# Patient Record
Sex: FEMALE | Race: WHITE | NOT HISPANIC OR LATINO | URBAN - NONMETROPOLITAN AREA
[De-identification: names, ages, dates, MRNs, and addresses within clinical notes are randomized per-mention and may not be internally consistent; named-entity substitution may affect disease eponyms.]

---

## 2021-01-25 ENCOUNTER — IMMUNIZATIONS (OUTPATIENT)
Dept: FAMILY MEDICINE CLINIC | Facility: HOSPITAL | Age: 81
End: 2021-01-25

## 2021-01-25 DIAGNOSIS — Z23 ENCOUNTER FOR IMMUNIZATION: Primary | ICD-10-CM

## 2021-01-25 PROCEDURE — 91301 SARS-COV-2 / COVID-19 MRNA VACCINE (MODERNA) 100 MCG: CPT | Performed by: PHARMACIST

## 2021-01-25 PROCEDURE — 0011A SARS-COV-2 / COVID-19 MRNA VACCINE (MODERNA) 100 MCG: CPT | Performed by: PHARMACIST

## 2021-02-24 ENCOUNTER — IMMUNIZATIONS (OUTPATIENT)
Dept: FAMILY MEDICINE CLINIC | Facility: HOSPITAL | Age: 81
End: 2021-02-24

## 2021-02-24 DIAGNOSIS — Z23 ENCOUNTER FOR IMMUNIZATION: Primary | ICD-10-CM

## 2021-02-24 PROCEDURE — 91301 SARS-COV-2 / COVID-19 MRNA VACCINE (MODERNA) 100 MCG: CPT

## 2021-02-24 PROCEDURE — 0012A SARS-COV-2 / COVID-19 MRNA VACCINE (MODERNA) 100 MCG: CPT

## 2023-12-02 ENCOUNTER — APPOINTMENT (EMERGENCY)
Dept: CT IMAGING | Facility: HOSPITAL | Age: 83
DRG: 064 | End: 2023-12-02
Payer: MEDICARE

## 2023-12-02 ENCOUNTER — APPOINTMENT (INPATIENT)
Dept: CT IMAGING | Facility: HOSPITAL | Age: 83
DRG: 064 | End: 2023-12-02
Payer: MEDICARE

## 2023-12-02 ENCOUNTER — APPOINTMENT (INPATIENT)
Dept: RADIOLOGY | Facility: HOSPITAL | Age: 83
DRG: 064 | End: 2023-12-02
Payer: MEDICARE

## 2023-12-02 ENCOUNTER — HOSPITAL ENCOUNTER (INPATIENT)
Facility: HOSPITAL | Age: 83
LOS: 3 days | Discharge: HOME HEALTH | DRG: 064 | End: 2023-12-05
Attending: EMERGENCY MEDICINE | Admitting: FAMILY MEDICINE
Payer: MEDICARE

## 2023-12-02 DIAGNOSIS — F41.9 ANXIETY: ICD-10-CM

## 2023-12-02 DIAGNOSIS — M54.41 CHRONIC MIDLINE LOW BACK PAIN WITH BILATERAL SCIATICA: ICD-10-CM

## 2023-12-02 DIAGNOSIS — R40.4 TRANSIENT ALTERATION OF AWARENESS: ICD-10-CM

## 2023-12-02 DIAGNOSIS — R94.01 ABNORMAL EEG: ICD-10-CM

## 2023-12-02 DIAGNOSIS — G89.29 CHRONIC MIDLINE LOW BACK PAIN WITH BILATERAL SCIATICA: ICD-10-CM

## 2023-12-02 DIAGNOSIS — F41.1 GENERALIZED ANXIETY DISORDER: ICD-10-CM

## 2023-12-02 DIAGNOSIS — G89.29 ACUTE EXACERBATION OF CHRONIC LOW BACK PAIN: ICD-10-CM

## 2023-12-02 DIAGNOSIS — I63.9 STROKE (HCC): ICD-10-CM

## 2023-12-02 DIAGNOSIS — M54.50 ACUTE EXACERBATION OF CHRONIC LOW BACK PAIN: ICD-10-CM

## 2023-12-02 DIAGNOSIS — E53.8 B12 DEFICIENCY: ICD-10-CM

## 2023-12-02 DIAGNOSIS — R41.82 ALTERED MENTAL STATE: ICD-10-CM

## 2023-12-02 DIAGNOSIS — M54.42 CHRONIC MIDLINE LOW BACK PAIN WITH BILATERAL SCIATICA: ICD-10-CM

## 2023-12-02 DIAGNOSIS — R47.9 DIFFICULTY WITH SPEECH: Primary | ICD-10-CM

## 2023-12-02 PROBLEM — G93.41 ACUTE METABOLIC ENCEPHALOPATHY: Status: ACTIVE | Noted: 2023-12-02

## 2023-12-02 PROBLEM — N18.31 STAGE 3A CHRONIC KIDNEY DISEASE (CKD) (HCC): Status: ACTIVE | Noted: 2023-12-02

## 2023-12-02 LAB
2HR DELTA HS TROPONIN: 3 NG/L
4HR DELTA HS TROPONIN: 2 NG/L
ALBUMIN SERPL BCP-MCNC: 3.8 G/DL (ref 3.5–5)
ALP SERPL-CCNC: 59 U/L (ref 34–104)
ALT SERPL W P-5'-P-CCNC: 11 U/L (ref 7–52)
AMMONIA PLAS-SCNC: 16 UMOL/L (ref 18–72)
AMPHETAMINES SERPL QL SCN: NEGATIVE
ANION GAP SERPL CALCULATED.3IONS-SCNC: 10 MMOL/L
ANION GAP SERPL CALCULATED.3IONS-SCNC: 9 MMOL/L
APAP SERPL-MCNC: <2 UG/ML (ref 10–20)
APTT PPP: 28 SECONDS (ref 23–37)
AST SERPL W P-5'-P-CCNC: 17 U/L (ref 13–39)
BACTERIA UR QL AUTO: NORMAL /HPF
BARBITURATES UR QL: NEGATIVE
BASOPHILS # BLD AUTO: 0.04 THOUSANDS/ÂΜL (ref 0–0.1)
BASOPHILS NFR BLD AUTO: 1 % (ref 0–1)
BENZODIAZ UR QL: NEGATIVE
BILIRUB SERPL-MCNC: 0.68 MG/DL (ref 0.2–1)
BILIRUB UR QL STRIP: NEGATIVE
BUN SERPL-MCNC: 14 MG/DL (ref 5–25)
BUN SERPL-MCNC: 18 MG/DL (ref 5–25)
CALCIUM SERPL-MCNC: 8 MG/DL (ref 8.4–10.2)
CALCIUM SERPL-MCNC: 9.5 MG/DL (ref 8.4–10.2)
CARDIAC TROPONIN I PNL SERPL HS: 4 NG/L
CARDIAC TROPONIN I PNL SERPL HS: 6 NG/L
CARDIAC TROPONIN I PNL SERPL HS: 7 NG/L
CHLORIDE SERPL-SCNC: 108 MMOL/L (ref 96–108)
CHLORIDE SERPL-SCNC: 108 MMOL/L (ref 96–108)
CK SERPL-CCNC: 31 U/L (ref 26–192)
CLARITY UR: CLEAR
CO2 SERPL-SCNC: 20 MMOL/L (ref 21–32)
CO2 SERPL-SCNC: 23 MMOL/L (ref 21–32)
COCAINE UR QL: NEGATIVE
COLOR UR: YELLOW
CREAT SERPL-MCNC: 0.73 MG/DL (ref 0.6–1.3)
CREAT SERPL-MCNC: 0.81 MG/DL (ref 0.6–1.3)
CRP SERPL QL: 1.2 MG/L
EOSINOPHIL # BLD AUTO: 0.12 THOUSAND/ÂΜL (ref 0–0.61)
EOSINOPHIL NFR BLD AUTO: 1 % (ref 0–6)
ERYTHROCYTE [DISTWIDTH] IN BLOOD BY AUTOMATED COUNT: 12.9 % (ref 11.6–15.1)
ERYTHROCYTE [SEDIMENTATION RATE] IN BLOOD: 16 MM/HOUR (ref 0–29)
ETHANOL SERPL-MCNC: <10 MG/DL
FLUAV RNA RESP QL NAA+PROBE: NEGATIVE
FLUBV RNA RESP QL NAA+PROBE: NEGATIVE
GFR SERPL CREATININE-BSD FRML MDRD: 67 ML/MIN/1.73SQ M
GFR SERPL CREATININE-BSD FRML MDRD: 76 ML/MIN/1.73SQ M
GLUCOSE SERPL-MCNC: 108 MG/DL (ref 65–140)
GLUCOSE SERPL-MCNC: 124 MG/DL (ref 65–140)
GLUCOSE SERPL-MCNC: 93 MG/DL (ref 65–140)
GLUCOSE SERPL-MCNC: 96 MG/DL (ref 65–140)
GLUCOSE UR STRIP-MCNC: NEGATIVE MG/DL
HCT VFR BLD AUTO: 48.1 % (ref 34.8–46.1)
HGB BLD-MCNC: 15.4 G/DL (ref 11.5–15.4)
HGB UR QL STRIP.AUTO: ABNORMAL
IMM GRANULOCYTES # BLD AUTO: 0.04 THOUSAND/UL (ref 0–0.2)
IMM GRANULOCYTES NFR BLD AUTO: 1 % (ref 0–2)
INR PPP: 0.97 (ref 0.84–1.19)
KETONES UR STRIP-MCNC: ABNORMAL MG/DL
LACTATE SERPL-SCNC: 2.1 MMOL/L (ref 0.5–2)
LACTATE SERPL-SCNC: 3 MMOL/L (ref 0.5–2)
LACTATE SERPL-SCNC: 4.2 MMOL/L (ref 0.5–2)
LACTATE SERPL-SCNC: 4.3 MMOL/L (ref 0.5–2)
LEUKOCYTE ESTERASE UR QL STRIP: NEGATIVE
LYMPHOCYTES # BLD AUTO: 1.29 THOUSANDS/ÂΜL (ref 0.6–4.47)
LYMPHOCYTES NFR BLD AUTO: 15 % (ref 14–44)
MCH RBC QN AUTO: 29.6 PG (ref 26.8–34.3)
MCHC RBC AUTO-ENTMCNC: 32 G/DL (ref 31.4–37.4)
MCV RBC AUTO: 92 FL (ref 82–98)
METHADONE UR QL: NEGATIVE
MONOCYTES # BLD AUTO: 0.59 THOUSAND/ÂΜL (ref 0.17–1.22)
MONOCYTES NFR BLD AUTO: 7 % (ref 4–12)
NEUTROPHILS # BLD AUTO: 6.56 THOUSANDS/ÂΜL (ref 1.85–7.62)
NEUTS SEG NFR BLD AUTO: 75 % (ref 43–75)
NITRITE UR QL STRIP: NEGATIVE
NON-SQ EPI CELLS URNS QL MICRO: NORMAL /HPF
NRBC BLD AUTO-RTO: 0 /100 WBCS
OPIATES UR QL SCN: NEGATIVE
OXYCODONE+OXYMORPHONE UR QL SCN: POSITIVE
PCP UR QL: NEGATIVE
PH UR STRIP.AUTO: 7.5 [PH]
PLATELET # BLD AUTO: 350 THOUSANDS/UL (ref 149–390)
PMV BLD AUTO: 8.5 FL (ref 8.9–12.7)
POTASSIUM SERPL-SCNC: 3.5 MMOL/L (ref 3.5–5.3)
POTASSIUM SERPL-SCNC: 3.9 MMOL/L (ref 3.5–5.3)
PROCALCITONIN SERPL-MCNC: <0.05 NG/ML
PROT SERPL-MCNC: 7 G/DL (ref 6.4–8.4)
PROT UR STRIP-MCNC: NEGATIVE MG/DL
PROTHROMBIN TIME: 13.2 SECONDS (ref 11.6–14.5)
RBC # BLD AUTO: 5.21 MILLION/UL (ref 3.81–5.12)
RBC #/AREA URNS AUTO: NORMAL /HPF
RSV RNA RESP QL NAA+PROBE: NEGATIVE
SARS-COV-2 RNA RESP QL NAA+PROBE: NEGATIVE
SODIUM SERPL-SCNC: 137 MMOL/L (ref 135–147)
SODIUM SERPL-SCNC: 141 MMOL/L (ref 135–147)
SP GR UR STRIP.AUTO: 1.01 (ref 1–1.03)
THC UR QL: NEGATIVE
TSH SERPL DL<=0.05 MIU/L-ACNC: 0.47 UIU/ML (ref 0.45–4.5)
URATE SERPL-MCNC: 5.5 MG/DL (ref 2–7.5)
UROBILINOGEN UR QL STRIP.AUTO: 0.2 E.U./DL
VIT B12 SERPL-MCNC: 180 PG/ML (ref 180–914)
WBC # BLD AUTO: 8.64 THOUSAND/UL (ref 4.31–10.16)
WBC #/AREA URNS AUTO: NORMAL /HPF

## 2023-12-02 PROCEDURE — 96375 TX/PRO/DX INJ NEW DRUG ADDON: CPT

## 2023-12-02 PROCEDURE — 84550 ASSAY OF BLOOD/URIC ACID: CPT

## 2023-12-02 PROCEDURE — 70498 CT ANGIOGRAPHY NECK: CPT

## 2023-12-02 PROCEDURE — 74176 CT ABD & PELVIS W/O CONTRAST: CPT

## 2023-12-02 PROCEDURE — 99223 1ST HOSP IP/OBS HIGH 75: CPT | Performed by: FAMILY MEDICINE

## 2023-12-02 PROCEDURE — 82948 REAGENT STRIP/BLOOD GLUCOSE: CPT

## 2023-12-02 PROCEDURE — 84443 ASSAY THYROID STIM HORMONE: CPT

## 2023-12-02 PROCEDURE — 80307 DRUG TEST PRSMV CHEM ANLYZR: CPT

## 2023-12-02 PROCEDURE — 82077 ASSAY SPEC XCP UR&BREATH IA: CPT

## 2023-12-02 PROCEDURE — 82550 ASSAY OF CK (CPK): CPT

## 2023-12-02 PROCEDURE — 81001 URINALYSIS AUTO W/SCOPE: CPT

## 2023-12-02 PROCEDURE — 80053 COMPREHEN METABOLIC PANEL: CPT | Performed by: EMERGENCY MEDICINE

## 2023-12-02 PROCEDURE — 87154 CUL TYP ID BLD PTHGN 6+ TRGT: CPT

## 2023-12-02 PROCEDURE — 80143 DRUG ASSAY ACETAMINOPHEN: CPT

## 2023-12-02 PROCEDURE — 99285 EMERGENCY DEPT VISIT HI MDM: CPT | Performed by: EMERGENCY MEDICINE

## 2023-12-02 PROCEDURE — 85730 THROMBOPLASTIN TIME PARTIAL: CPT | Performed by: EMERGENCY MEDICINE

## 2023-12-02 PROCEDURE — 72131 CT LUMBAR SPINE W/O DYE: CPT

## 2023-12-02 PROCEDURE — 85025 COMPLETE CBC W/AUTO DIFF WBC: CPT | Performed by: EMERGENCY MEDICINE

## 2023-12-02 PROCEDURE — 87040 BLOOD CULTURE FOR BACTERIA: CPT

## 2023-12-02 PROCEDURE — 99285 EMERGENCY DEPT VISIT HI MDM: CPT

## 2023-12-02 PROCEDURE — 0241U HB NFCT DS VIR RESP RNA 4 TRGT: CPT | Performed by: EMERGENCY MEDICINE

## 2023-12-02 PROCEDURE — 84484 ASSAY OF TROPONIN QUANT: CPT | Performed by: EMERGENCY MEDICINE

## 2023-12-02 PROCEDURE — 84145 PROCALCITONIN (PCT): CPT

## 2023-12-02 PROCEDURE — 85610 PROTHROMBIN TIME: CPT | Performed by: EMERGENCY MEDICINE

## 2023-12-02 PROCEDURE — 70496 CT ANGIOGRAPHY HEAD: CPT

## 2023-12-02 PROCEDURE — 82140 ASSAY OF AMMONIA: CPT

## 2023-12-02 PROCEDURE — 83605 ASSAY OF LACTIC ACID: CPT

## 2023-12-02 PROCEDURE — 36415 COLL VENOUS BLD VENIPUNCTURE: CPT | Performed by: EMERGENCY MEDICINE

## 2023-12-02 PROCEDURE — 71045 X-RAY EXAM CHEST 1 VIEW: CPT

## 2023-12-02 PROCEDURE — 85652 RBC SED RATE AUTOMATED: CPT

## 2023-12-02 PROCEDURE — 82607 VITAMIN B-12: CPT

## 2023-12-02 PROCEDURE — 86140 C-REACTIVE PROTEIN: CPT

## 2023-12-02 PROCEDURE — 96374 THER/PROPH/DIAG INJ IV PUSH: CPT

## 2023-12-02 PROCEDURE — 93005 ELECTROCARDIOGRAM TRACING: CPT

## 2023-12-02 PROCEDURE — 80048 BASIC METABOLIC PNL TOTAL CA: CPT

## 2023-12-02 RX ORDER — KETOROLAC TROMETHAMINE 30 MG/ML
15 INJECTION, SOLUTION INTRAMUSCULAR; INTRAVENOUS EVERY 6 HOURS PRN
Status: DISCONTINUED | OUTPATIENT
Start: 2023-12-02 | End: 2023-12-02

## 2023-12-02 RX ORDER — LIDOCAINE 50 MG/G
1 PATCH TOPICAL DAILY
Status: DISCONTINUED | OUTPATIENT
Start: 2023-12-02 | End: 2023-12-05 | Stop reason: HOSPADM

## 2023-12-02 RX ORDER — ENOXAPARIN SODIUM 100 MG/ML
40 INJECTION SUBCUTANEOUS DAILY
Status: DISCONTINUED | OUTPATIENT
Start: 2023-12-02 | End: 2023-12-02

## 2023-12-02 RX ORDER — SODIUM CHLORIDE 9 MG/ML
125 INJECTION, SOLUTION INTRAVENOUS CONTINUOUS
Status: DISCONTINUED | OUTPATIENT
Start: 2023-12-02 | End: 2023-12-03

## 2023-12-02 RX ORDER — HYDROMORPHONE HCL IN WATER/PF 6 MG/30 ML
0.2 PATIENT CONTROLLED ANALGESIA SYRINGE INTRAVENOUS EVERY 6 HOURS PRN
Status: DISCONTINUED | OUTPATIENT
Start: 2023-12-02 | End: 2023-12-05 | Stop reason: HOSPADM

## 2023-12-02 RX ORDER — ACETAMINOPHEN 325 MG/1
975 TABLET ORAL EVERY 6 HOURS
Status: DISCONTINUED | OUTPATIENT
Start: 2023-12-02 | End: 2023-12-05 | Stop reason: HOSPADM

## 2023-12-02 RX ORDER — DIAZEPAM 2 MG/1
2 TABLET ORAL ONCE
Status: COMPLETED | OUTPATIENT
Start: 2023-12-02 | End: 2023-12-02

## 2023-12-02 RX ORDER — KETOROLAC TROMETHAMINE 30 MG/ML
15 INJECTION, SOLUTION INTRAMUSCULAR; INTRAVENOUS ONCE
Status: COMPLETED | OUTPATIENT
Start: 2023-12-02 | End: 2023-12-02

## 2023-12-02 RX ORDER — ONDANSETRON 2 MG/ML
INJECTION INTRAMUSCULAR; INTRAVENOUS
Status: COMPLETED
Start: 2023-12-02 | End: 2023-12-02

## 2023-12-02 RX ORDER — SENNOSIDES 8.6 MG
650 CAPSULE ORAL EVERY 8 HOURS PRN
COMMUNITY

## 2023-12-02 RX ORDER — KETOROLAC TROMETHAMINE 30 MG/ML
15 INJECTION, SOLUTION INTRAMUSCULAR; INTRAVENOUS EVERY 6 HOURS SCHEDULED
Status: DISPENSED | OUTPATIENT
Start: 2023-12-02 | End: 2023-12-03

## 2023-12-02 RX ORDER — HEPARIN SODIUM 5000 [USP'U]/ML
5000 INJECTION, SOLUTION INTRAVENOUS; SUBCUTANEOUS EVERY 8 HOURS SCHEDULED
Status: DISCONTINUED | OUTPATIENT
Start: 2023-12-02 | End: 2023-12-05 | Stop reason: HOSPADM

## 2023-12-02 RX ORDER — ONDANSETRON 2 MG/ML
4 INJECTION INTRAMUSCULAR; INTRAVENOUS EVERY 6 HOURS PRN
Status: DISCONTINUED | OUTPATIENT
Start: 2023-12-02 | End: 2023-12-05 | Stop reason: HOSPADM

## 2023-12-02 RX ORDER — OXYCODONE HYDROCHLORIDE 5 MG/1
5 TABLET ORAL EVERY 6 HOURS PRN
Status: DISCONTINUED | OUTPATIENT
Start: 2023-12-02 | End: 2023-12-05 | Stop reason: HOSPADM

## 2023-12-02 RX ORDER — LABETALOL HYDROCHLORIDE 5 MG/ML
10 INJECTION, SOLUTION INTRAVENOUS ONCE
Status: COMPLETED | OUTPATIENT
Start: 2023-12-02 | End: 2023-12-02

## 2023-12-02 RX ADMIN — HEPARIN SODIUM 5000 UNITS: 5000 INJECTION INTRAVENOUS; SUBCUTANEOUS at 14:05

## 2023-12-02 RX ADMIN — Medication 10 MG: at 08:55

## 2023-12-02 RX ADMIN — KETOROLAC TROMETHAMINE 15 MG: 30 INJECTION, SOLUTION INTRAMUSCULAR; INTRAVENOUS at 14:01

## 2023-12-02 RX ADMIN — SODIUM CHLORIDE 1000 ML: 0.9 INJECTION, SOLUTION INTRAVENOUS at 15:38

## 2023-12-02 RX ADMIN — SODIUM CHLORIDE 125 ML/HR: 0.9 INJECTION, SOLUTION INTRAVENOUS at 17:32

## 2023-12-02 RX ADMIN — OXYCODONE HYDROCHLORIDE 5 MG: 5 TABLET ORAL at 15:46

## 2023-12-02 RX ADMIN — SODIUM CHLORIDE 1000 ML: 0.9 INJECTION, SOLUTION INTRAVENOUS at 12:44

## 2023-12-02 RX ADMIN — HEPARIN SODIUM 5000 UNITS: 5000 INJECTION INTRAVENOUS; SUBCUTANEOUS at 21:29

## 2023-12-02 RX ADMIN — SODIUM CHLORIDE 125 ML/HR: 0.9 INJECTION, SOLUTION INTRAVENOUS at 13:34

## 2023-12-02 RX ADMIN — LIDOCAINE 5% 1 PATCH: 700 PATCH TOPICAL at 12:45

## 2023-12-02 RX ADMIN — TRIMETHOBENZAMIDE HYDROCHLORIDE 200 MG: 100 INJECTION INTRAMUSCULAR at 22:38

## 2023-12-02 RX ADMIN — DIAZEPAM 2 MG: 2 TABLET ORAL at 09:56

## 2023-12-02 RX ADMIN — ONDANSETRON 4 MG: 2 INJECTION INTRAMUSCULAR; INTRAVENOUS at 15:42

## 2023-12-02 RX ADMIN — KETOROLAC TROMETHAMINE 15 MG: 30 INJECTION, SOLUTION INTRAMUSCULAR; INTRAVENOUS at 08:56

## 2023-12-02 RX ADMIN — ONDANSETRON 4 MG: 2 INJECTION INTRAMUSCULAR; INTRAVENOUS at 12:32

## 2023-12-02 RX ADMIN — IOHEXOL 92 ML: 350 INJECTION, SOLUTION INTRAVENOUS at 08:46

## 2023-12-02 RX ADMIN — ONDANSETRON 4 MG: 2 INJECTION INTRAMUSCULAR; INTRAVENOUS at 21:29

## 2023-12-02 RX ADMIN — KETOROLAC TROMETHAMINE 15 MG: 30 INJECTION, SOLUTION INTRAMUSCULAR; INTRAVENOUS at 22:38

## 2023-12-02 NOTE — STROKE DOCUMENTATION
Pt no longer having difficulty with word finding. Appears less anxious. Lights dimmed.  Friend at bedside

## 2023-12-02 NOTE — ASSESSMENT & PLAN NOTE
POA with slow speech and poor mentation per neighbor/friend. Also with worsening back pain since morning of admission. Son states patient has history of AMS when "traumatic" events occur, and attributes current mentation to severe back pain  Neuro consulted and recommending head ct and CTA. No need for stroke workup as both images negative for any acute process  TSH, UDS, ethanol level pending  UA and procal negative. Afebrile and without leukocytosis  Lactic mildly elevated, given 1L bolus and started on IVF continuous   Flu/covid/rsv negative  C diff and stool samples pending 2/2 diarrhea  Psych consulted due to very anxious and tearful exam with poor mentation  CT AP without any infectious process. Diverticulosis, renal cysts without hydronephrosis  CXR pending  Infectious workup negative thus far  Continue to reorient as needed.  Delirium precautions

## 2023-12-02 NOTE — ED PROVIDER NOTES
History  Chief Complaint   Patient presents with    Back Pain     Pt states acute on chronic back pain worse today when getting up to go to the bathroom. States hx of radiculopathy. 80-year-old female with a history of chronic back pain presents today with an acute onset of back pain when trying to get out of bed. She was able to get to the bathroom but then had to crawl to her phone because she could not ambulate any longer. She reported that the pain was severe. Denied any trauma. Patient also reports bowel incontinence but this is chronic for  "years"      History provided by:  Patient and friend  Back Pain  Location:  Lumbar spine  Quality:  Aching and stabbing  Radiates to:  L thigh, L knee and L foot  Pain severity:  Severe  Onset quality:  Sudden  Timing:  Constant  Progression:  Unchanged  Chronicity:  New  Context: emotional stress    Relieved by:  Lying down  Worsened by:  Ambulation and bending  Associated symptoms: bowel incontinence (chronic), leg pain, numbness (chronic) and paresthesias (chronic)    Associated symptoms: no bladder incontinence, no chest pain, no perianal numbness, no tingling and no weakness    Risk factors: hx of osteoporosis and obesity        None       Past Medical History:   Diagnosis Date    Arthritis        History reviewed. No pertinent surgical history. History reviewed. No pertinent family history. I have reviewed and agree with the history as documented. E-Cigarette/Vaping    E-Cigarette Use Never User      E-Cigarette/Vaping Substances     Social History     Tobacco Use    Smoking status: Never    Smokeless tobacco: Never   Vaping Use    Vaping Use: Never used   Substance Use Topics    Alcohol use: Never    Drug use: Never       Review of Systems   Constitutional:  Negative for fatigue. HENT: Negative. Respiratory:  Negative for shortness of breath and wheezing. Cardiovascular:  Negative for chest pain and palpitations.    Gastrointestinal: Positive for bowel incontinence (chronic). Negative for abdominal distention, nausea and vomiting. Genitourinary:  Negative for bladder incontinence and flank pain. Musculoskeletal:  Positive for back pain. Neurological:  Positive for numbness (chronic) and paresthesias (chronic). Negative for tingling and weakness. Psychiatric/Behavioral:  The patient is nervous/anxious. All other systems reviewed and are negative. Physical Exam  Physical Exam  Vitals and nursing note reviewed. Constitutional:       General: She is awake. She is in acute distress. Appearance: Normal appearance. She is well-developed. She is obese. She is not ill-appearing or toxic-appearing. HENT:      Head: Normocephalic and atraumatic. Right Ear: External ear normal.      Left Ear: External ear normal.      Nose: Nose normal.      Mouth/Throat:      Mouth: Mucous membranes are moist.   Eyes:      General: Lids are normal. No scleral icterus. Extraocular Movements: Extraocular movements intact. Pupils: Pupils are equal, round, and reactive to light. Cardiovascular:      Rate and Rhythm: Normal rate and regular rhythm. Pulses: No decreased pulses. Heart sounds: Normal heart sounds. No murmur heard. Comments: Good cap refill  Pulmonary:      Effort: Pulmonary effort is normal. No respiratory distress. Breath sounds: Normal breath sounds. No wheezing, rhonchi or rales. Abdominal:      General: Abdomen is flat. There is no distension. Palpations: Abdomen is soft. Tenderness: There is no abdominal tenderness. There is no right CVA tenderness, left CVA tenderness, guarding or rebound. Musculoskeletal:         General: Tenderness present. No swelling, deformity or signs of injury. Normal range of motion. Cervical back: Normal range of motion and neck supple. Thoracic back: Normal.      Lumbar back: Tenderness and bony tenderness present. No spasms.       Right lower leg: No edema. Left lower leg: No edema. Skin:     General: Skin is warm and dry. Coloration: Skin is not jaundiced or pale. Findings: No rash. Neurological:      Mental Status: She is alert. Cranial Nerves: No cranial nerve deficit. Motor: No weakness. Comments: See NIH   Psychiatric:         Attention and Perception: She is inattentive. Mood and Affect: Mood is anxious. Affect is tearful. Speech: Speech is delayed.          Vital Signs  ED Triage Vitals   Temperature Pulse Respirations Blood Pressure SpO2   12/02/23 0731 12/02/23 0731 12/02/23 0731 12/02/23 0731 12/02/23 0731   97.5 °F (36.4 °C) 88 18 (!) 187/81 100 %      Temp Source Heart Rate Source Patient Position - Orthostatic VS BP Location FiO2 (%)   12/02/23 0731 12/02/23 0825 -- -- --   Temporal Monitor         Pain Score       12/02/23 0731       8           Vitals:    12/02/23 0855 12/02/23 0910 12/02/23 0925 12/02/23 0940   BP: (!) 188/77 (!) 180/78 150/72 168/81   Pulse: 86 88 84 81         Visual Acuity  Visual Acuity      Flowsheet Row Most Recent Value   L Pupil Size (mm) 3   R Pupil Size (mm) 3            ED Medications  Medications   ketorolac (TORADOL) injection 15 mg (15 mg Intravenous Given 12/2/23 0856)   iohexol (OMNIPAQUE) 350 MG/ML injection (SINGLE-DOSE) 92 mL (92 mL Intravenous Given 12/2/23 0846)   labetalol (NORMODYNE) injection 10 mg (10 mg Intravenous Given 12/2/23 0855)   diazepam (VALIUM) tablet 2 mg (2 mg Oral Given 12/2/23 0956)       Diagnostic Studies  Results Reviewed       Procedure Component Value Units Date/Time    HS Troponin 0hr (reflex protocol) [[id number]]  (Normal) Collected: 12/02/23 0853    Lab Status: Final result Specimen: Blood from Arm, Right Updated: 12/02/23 0922     hs TnI 0hr 4 ng/L     HS Troponin I 2hr [[id number]]     Lab Status: No result Specimen: Blood     Protime-INR [[id number]]  (Normal) Collected: 12/02/23 0853    Lab Status: Final result Specimen: Blood from Arm, Right Updated: 12/02/23 0916     Protime 13.2 seconds      INR 0.97    APTT [[id number]]  (Normal) Collected: 12/02/23 0853    Lab Status: Final result Specimen: Blood from Arm, Right Updated: 12/02/23 0916     PTT 28 seconds     Comprehensive metabolic panel [[id number]] Collected: 12/02/23 0827    Lab Status: Final result Specimen: Blood from Arm, Right Updated: 12/02/23 0913     Sodium 141 mmol/L      Potassium 3.5 mmol/L      Chloride 108 mmol/L      CO2 23 mmol/L      ANION GAP 10 mmol/L      BUN 18 mg/dL      Creatinine 0.81 mg/dL      Glucose 108 mg/dL      Calcium 9.5 mg/dL      AST 17 U/L      ALT 11 U/L      Alkaline Phosphatase 59 U/L      Total Protein 7.0 g/dL      Albumin 3.8 g/dL      Total Bilirubin 0.68 mg/dL      eGFR 67 ml/min/1.73sq m     Narrative:      [facility] Disease (CKD):     Stage 1 with normal or high GFR (GFR > 90 mL/min/1.73 square meters)    Stage 2 Mild CKD (GFR = 60-89 mL/min/1.73 square meters)    Stage 3A Moderate CKD (GFR = 45-59 mL/min/1.73 square meters)    Stage 3B Moderate CKD (GFR = 30-44 mL/min/1.73 square meters)    Stage 4 Severe CKD (GFR = 15-29 mL/min/1.73 square meters)    Stage 5 End Stage CKD (GFR <15 mL/min/1.73 square meters)  Note: GFR calculation is accurate only with a steady state creatinine    FLU/RSV/COVID - if FLU/RSV clinically relevant [[id number]] Collected: 12/02/23 0853    Lab Status:  In process Specimen: Nares from Nasopharyngeal Swab Updated: 12/02/23 0855    CBC and differential [[id number]]  (Abnormal) Collected: 12/02/23 0827    Lab Status: Final result Specimen: Blood from Arm, Right Updated: 12/02/23 0847     WBC 8.64 Thousand/uL      RBC 5.21 Million/uL      Hemoglobin 15.4 g/dL      Hematocrit 48.1 %      MCV 92 fL      MCH 29.6 pg      MCHC 32.0 g/dL      RDW 12.9 %      MPV 8.5 fL      Platelets 009 Thousands/uL      nRBC 0 /100 WBCs      Neutrophils Relative 75 %      Immat GRANS % 1 %      Lymphocytes Relative 15 %      Monocytes Relative 7 %      Eosinophils Relative 1 %      Basophils Relative 1 %      Neutrophils Absolute 6.56 Thousands/µL      Immature Grans Absolute 0.04 Thousand/uL      Lymphocytes Absolute 1.29 Thousands/µL      Monocytes Absolute 0.59 Thousand/µL      Eosinophils Absolute 0.12 Thousand/µL      Basophils Absolute 0.04 Thousands/µL     Fingerstick Glucose (POCT) [[id number]]  (Normal) Collected: 12/02/23 0822    Lab Status: Final result Updated: 12/02/23 0823     POC Glucose 96 mg/dl                    CTA stroke alert (head/neck)   Final Result by Ben Call MD (12/02 0855)      1. No intracranial large vessel occlusion. 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 2.  No hemodynamically significant stenosis or dissection of cervical carotid and vertebral arteries. 3.  Severe multinodular thyromegaly. Correlate with prior thyroid work-up/ultrasound. Findings were directly discussed with Shane Charles at 8:52 a.m. Workstation performed: [id number]         CT stroke alert brain   Final Result by Ben Call MD (12/02 5181)      No acute intracranial CT abnormality. Findings were directly discussed with Shane Charles at 8:52 a.m. Workstation performed: [id number]         CT spine lumbar without contrast    (Results Pending)              Procedures  Procedures         ED Course  ED Course as of 12/02/23 0959   Sat Dec 02, 2023   0825 Called to room by nursing because patient "not acting right" as per neighbor at bedside. Pt not able to name objects per nurse and per patient. Stroke alert called   0830 Patient reevaluated. Patient is extremely anxious. Patient is tearful. Patient will follow commands. Patient will not name objects but then will patient will not name objects but then will turn abruptly and asked the neighbor to make sure she has her purse.   Will maintain stroke alert and an abundance of caution. Neurology consulted. Suspect anxiety component. 4537 D/w Dr. Aicha Bates (neurology)   9020 CTA shows no large vessel occulusion. CT brain negative   0930 Neurology consultation appreciated. Agree that this is low likelihood of stroke. 5508 Will attempt to ambulate. 0998 Ambulated independently with walker. 8391 Patient now having another episode of confusion. Will admit. 3793 Sx appear mostly related to mental health, however, not completely clear. Patient is not safe to discharge to home. May not be safe long term in home. Referred for admission                  Stroke Assessment       Row Name 12/02/23 0831             NIH Stroke Scale    Interval Baseline      Level of Consciousness (1a.) 0      LOC Questions (1b.) 0      LOC Commands (1c.) 0      Best Gaze (2.) 0      Visual (3.) 0      Facial Palsy (4.) 0      Motor Arm, Left (5a.) 0      Motor Arm, Right (5b.) 0      Motor Leg, Left (6a.) 0      Motor Leg, Right (6b.) 0      Limb Ataxia (7.) 0      Sensory (8.) 0      Best Language (9.) 1      Dysarthria (10.) 0      Extinction and Inattention (11.) (Formerly Neglect) 0      Total 1                    Flowsheet Row Most Recent Value   Thrombolytic Decision Options    Thrombolytic Decision Patient not a candidate. Patient is not a candidate options comment  [Alternartive dx more likely]                      SBIRT 22yo+      Flowsheet Row Most Recent Value   Initial Alcohol Screen: US AUDIT-C     1. How often do you have a drink containing alcohol? 0 Filed at: 12/02/2023 0733   2. How many drinks containing alcohol do you have on a typical day you are drinking? 0 Filed at: 12/02/2023 0733   3b. FEMALE Any Age, or MALE 65+: How often do you have 4 or more drinks on one occassion? 0 Filed at: 12/02/2023 2494   Audit-C Score 0 Filed at: 12/02/2023 0181   PITER: How many times in the past year have you. ..     Used an illegal drug or used a prescription medication for non-medical reasons? Never Filed at: 12/02/2023 5735                      Medical Decision Making  Patient presented to the emergency department and a MSE was performed. The patient was evaluated for complaint related to acute back pain. Patient is potentially at risk for, but not limited to, musculoskeletal pain, sciatica, degenerative disc disease, traumatic injury, occult fracture, discitis, osteomyelitis, spinal cord abscess  or other infectious etiology, spinal cord hemorrhage, conus medullaris, or cauda equina syndrome. Several of these diagnoses have been evaluated and ruled out by history and physical.  As needed, patient will be further evaluated with laboratory and imaging studies. Higher level diagnostics, such as CT imaging or ultrasound, may also be required. Please see work-up portion of the note for further evaluation of patient's risk. Socioeconomic factors were also considered as part of the decision-making process. Unless otherwise stated in the chart or patient is admitted as elsewhere documented, any previously prescribed medications will be maintained. Problems Addressed:  Acute exacerbation of chronic low back pain: chronic illness or injury with severe exacerbation, progression, or side effects of treatment  Altered mental state: complicated acute illness or injury with systemic symptoms  Difficulty with speech: undiagnosed new problem with uncertain prognosis  Generalized anxiety disorder: chronic illness or injury with exacerbation, progression, or side effects of treatment    Amount and/or Complexity of Data Reviewed  Labs: ordered. Radiology: ordered. Risk  Prescription drug management. Decision regarding hospitalization. Risk Details: Patient presented to the emergency department and a MSE was performed. The patient was evaluated and diagnosed with acute altered mental status and intractable back pain.  This is a new issue that will require additional planned work-up and treatment in a hospitalized setting. As may have been required as part of this evaluation, clinical laboratory test, radiology imaging and medical testing (I.e. EKG) were ordered as necessitated by the patient's presentation. I independently reviewed these studies, imaging and testing. This patient's case is considered to be a considerable risk secondary to the above listed disease process and poses a threat to the patient's well-being and baseline function. Further in-patient diagnostic testing and management, which may include the administration of parenteral medications, is required. Disposition  Final diagnoses:   Difficulty with speech   Acute exacerbation of chronic low back pain   Generalized anxiety disorder   Altered mental state     Time reflects when diagnosis was documented in both MDM as applicable and the Disposition within this note       Time User Action Codes Description Comment    12/2/2023  8:29 AM Aung Nicholson [R47.9] Difficulty with speech     12/2/2023  8:31 AM Aung Nicholson [M54.50,  G89.29] Acute exacerbation of chronic low back pain     12/2/2023  8:31 AM Aung Nicholson [F41.1] Generalized anxiety disorder     12/2/2023  9:53 AM Aung Nicholson [R41.82] Altered mental state           ED Disposition       ED Disposition   Admit    Condition   Stable    Date/Time   Sat Dec 2, 2023 1599    Comment                  Follow-up Information    None         Patient's Medications    No medications on file       No discharge procedures on file.     PDMP Review       None            ED Provider  Electronically Signed by             Nawaf Fatima DO  12/02/23 0487

## 2023-12-02 NOTE — ASSESSMENT & PLAN NOTE
History of degenerative disc disease with worsening pain since morning of admission when getting out of bed  Lumbar CT showing no acute fracture or malalignment. Chronic degenerative changes   History of bowel incontinence and chronic diarrhea  MRI lumbar spine pending to rule out cord compression  Pain management - heat, toradol, tylenol, and lidoderm patch.  Avoid narcotics due to AMS  PT/OT consulted

## 2023-12-02 NOTE — ED NOTES
Pt assisted onto bedside commode, episode of diarrhea. Pt placed back in bed.  Awaits inpatient bed at this time     Akil Hooper, LAURA  12/02/23 0610

## 2023-12-02 NOTE — QUICK NOTE
Stroke alert note: An 79 y/o female who is here for back pain, was noted by the ED physician to be extremely anxious and emotional during the encounter. After he stepped out of the room, he was called back as she was having difficulties answering questions. However he noted that she was not consistent, having difficulties when asked directly (what is this object?) but can talk fluently when talking to her friend in the room for example. NIHSS 1 for language. Stroke alert was activated. However, the likelihood of this being a stroke based on the information provided is quite low. We agree with obtaining initial stroke alert imaging (CT and CTA), but if those are negative, and if the patient returns to complete baseline of normal language, then yield is extremely low, and we do not recommend stroke work-up. TNK not given: low NIHSS.  Low likelihood of a stroke

## 2023-12-02 NOTE — ED NOTES
Patient appearing to be very anxious at this time. Patient shaking, stating her back hurts with nausea. Patient continues to dry heave. Patient requesting for PO water & able to tolerate with no vomiting present, however, continues to dry heave. BS checked as son stating patient has not eaten today.  BS normal.      Maya Ashley RN  12/02/23 6351

## 2023-12-02 NOTE — ED NOTES
Patient ambulating to bedside commode with assistance of staff; patient endorses back pain with nausea & headache. Patient continues to have periods of confusion with difficulty finding words. Previous RN at bedside & stating similar episode occurring when patient arrived to ED. [place] text sent to inpatient [facility]. Verbal order for 4mg zofran to be given per Dr. Monica Graham.      Izzy Long  12/02/23 4590

## 2023-12-02 NOTE — ED NOTES
Rn was called to room by friend of patient, pt unable to answer simple questions      Mckay Phoenix RN  12/02/23 0833

## 2023-12-02 NOTE — ED NOTES
Patient transported to [address] [place] [address], [address], [address]  12/02/23 9924

## 2023-12-02 NOTE — H&P
[facility]  H&P  Name: Claudia Habermann 80 y.o. female I MRN: [id number]  Unit/Bed#: -01 I Date of Admission: 12/2/2023   Date of Service: 12/2/2023 I Hospital Day: 0      Assessment/Plan   * Acute metabolic encephalopathy  Assessment & Plan  POA with slow speech and poor mentation per neighbor/friend. Also with worsening back pain since morning of admission. Son states patient has history of AMS when "traumatic" events occur, and attributes current mentation to severe back pain  Neuro consulted and recommending head ct and CTA. No need for stroke workup as both images negative for any acute process  TSH, UDS, ethanol level pending  UA and procal negative. Afebrile and without leukocytosis  Lactic mildly elevated, given 1L bolus and started on IVF continuous   Flu/covid/rsv negative  C diff and stool samples pending 2/2 diarrhea  Psych consulted due to very anxious and tearful exam with poor mentation  CT AP without any infectious process. Diverticulosis, renal cysts without hydronephrosis  CXR pending  Infectious workup negative thus far  Continue to reorient as needed. Delirium precautions    Stage 3a chronic kidney disease (CKD) ([facility])  Assessment & Plan  Lab Results   Component Value Date    EGFR 67 12/02/2023    CREATININE 0.81 12/02/2023   Currently at baseline   Avoid nephrotoxic agent and hypotension    Chronic low back pain  Assessment & Plan  History of degenerative disc disease with worsening pain since morning of admission when getting out of bed  Lumbar CT showing no acute fracture or malalignment. Chronic degenerative changes   History of bowel incontinence and chronic diarrhea  MRI lumbar spine pending to rule out cord compression  Pain management - heat, toradol, tylenol, and lidoderm patch. Avoid narcotics due to AMS  PT/OT consulted          VTE Pharmacologic Prophylaxis:   High Risk (Score >/= 5) - Pharmacological DVT Prophylaxis Ordered: heparin.  Sequential Compression Devices Ordered. Code Status: Level 3 - DNAR and DNI   Discussion with family: Updated  (son) at bedside. Anticipated Length of Stay: Patient will be admitted on an inpatient basis with an anticipated length of stay of greater than 2 midnights secondary to acute metabolic encephalopathy. Total Time Spent on Date of Encounter in care of patient: 60 mins. This time was spent on one or more of the following: performing physical exam; counseling and coordination of care; obtaining or reviewing history; documenting in the medical record; reviewing/ordering tests, medications or procedures; communicating with other healthcare professionals and discussing with patient's family/caregivers. Chief Complaint: "my back has been hurting worse since this morning"    History of Present Illness:  Tiff Monroe is a 80 y.o. female with a PMH of chronic low back pain and ckd stage 3a who presents with back pain. Son at bedside to provide most of history. States patient woke up this morning was trying to ambulate to the bathroom when her back pain got severely worse. States she has chronic back pain with left lower extremity sciatica, however this morning got acutely worse when ambulating. No falls at home. Patient did ambulate to bathroom and then called neighbor to come help her. EMS brought patient to ED. Patient poor historian on exam.  Patient lives alone and is independent at baseline and uses cane to get around. Denies any recent trauma or sick contacts. Son states patient has chronic diarrhea and is sometimes incontinent of bowels, however this has gone on for years. Review of Systems:  Review of Systems   Constitutional:  Negative for diaphoresis, fatigue and fever. HENT:  Negative for congestion, sneezing and sore throat. Respiratory:  Negative for cough, chest tightness, shortness of breath and wheezing.     Cardiovascular:  Negative for chest pain, palpitations and leg swelling. Gastrointestinal:  Positive for abdominal pain and diarrhea. Negative for abdominal distention, blood in stool, constipation and vomiting. Genitourinary:  Negative for difficulty urinating and dysuria. Musculoskeletal:  Positive for back pain and gait problem. Negative for arthralgias. Neurological:  Positive for headaches. Negative for dizziness, tremors, speech difficulty, weakness and numbness. Psychiatric/Behavioral:  Negative for agitation, behavioral problems and confusion. Past Medical and Surgical History:   Past Medical History:   Diagnosis Date    Anxiety     Arthritis     Depression     Microscopic hematuria     Renal insufficiency        Past Surgical History:   Procedure Laterality Date    ARTHROSCOPY KNEE      CHOLECYSTECTOMY      MENISCECTOMY         Meds/Allergies:  Prior to Admission medications    Not on File     I have reviewed home medications with patient personally. Allergies: Allergies   Allergen Reactions    Levofloxacin Rash     Itching,nausea    Shellfish-Derived Products - Food Allergy Swelling    Moxifloxacin Hives, Rash and Swelling    Penicillins Rash     hives       Social History:  Marital Status:    Patient Pre-hospital Living Situation: Home  Patient Pre-hospital Level of Mobility: walks with cane  Patient Pre-hospital Diet Restrictions: None  Substance Use History:   Social History     Substance and Sexual Activity   Alcohol Use Never     Social History     Tobacco Use   Smoking Status Never   Smokeless Tobacco Never     Social History     Substance and Sexual Activity   Drug Use Never       Family History:  History reviewed. No pertinent family history.     Physical Exam:     Vitals:   Blood Pressure: 106/71 (12/02/23 1518)  Pulse: 98 (12/02/23 1518)  Temperature: 98.6 °F (37 °C) (12/02/23 1333)  Temp Source: Oral (12/02/23 1333)  Respirations: 18 (12/02/23 1400)  Height: 5' 6" (167.6 cm) (12/02/23 1049)  Weight - Scale: 88 kg (194 lb 0.1 oz) (12/02/23 0731)  SpO2: 98 % (12/02/23 1518)    Physical Exam  Vitals reviewed. Constitutional:       General: She is not in acute distress. Appearance: Normal appearance. She is obese. She is ill-appearing. HENT:      Head: Normocephalic and atraumatic. Nose: Nose normal.      Mouth/Throat:      Mouth: Mucous membranes are dry. Pharynx: Oropharynx is clear. Eyes:      Extraocular Movements: Extraocular movements intact. Conjunctiva/sclera: Conjunctivae normal.   Cardiovascular:      Rate and Rhythm: Normal rate and regular rhythm. Pulses: Normal pulses. Heart sounds: Normal heart sounds. No murmur heard. Pulmonary:      Effort: Pulmonary effort is normal. No respiratory distress. Breath sounds: Normal breath sounds. No wheezing or rhonchi. Abdominal:      General: Abdomen is flat. Bowel sounds are normal. There is no distension. Palpations: Abdomen is soft. Tenderness: There is no abdominal tenderness. There is no guarding. Musculoskeletal:         General: Tenderness present. Normal range of motion. Cervical back: Normal range of motion. Right lower leg: No edema. Left lower leg: No edema. Comments: Tenderness to palpation of back  Not cooperating in physical exam due to back pain, but ambulated to bedside commode   Skin:     General: Skin is warm. Neurological:      General: No focal deficit present. Mental Status: She is alert and oriented to person, place, and time. Mental status is at baseline. Cranial Nerves: No cranial nerve deficit. Sensory: No sensory deficit. Motor: No weakness.    Psychiatric:      Comments: Appears anxious and in pain  Oriented to self, not answering other questions because of her back pain          Additional Data:     Lab Results:  Results from last 7 days   Lab Units 12/02/23  0827   WBC Thousand/uL 8.64   HEMOGLOBIN g/dL 15.4   HEMATOCRIT % 48.1*   PLATELETS Thousands/uL 350   NEUTROS PCT % 75   LYMPHS PCT % 15   MONOS PCT % 7   EOS PCT % 1     Results from last 7 days   Lab Units 12/02/23  0827   SODIUM mmol/L 141   POTASSIUM mmol/L 3.5   CHLORIDE mmol/L 108   CO2 mmol/L 23   BUN mg/dL 18   CREATININE mg/dL 0.81   ANION GAP mmol/L 10   CALCIUM mg/dL 9.5   ALBUMIN g/dL 3.8   TOTAL BILIRUBIN mg/dL 0.68   ALK PHOS U/L 59   ALT U/L 11   AST U/L 17   GLUCOSE RANDOM mg/dL 108     Results from last 7 days   Lab Units 12/02/23  0853   INR  0.97     Results from last 7 days   Lab Units 12/02/23  1407 12/02/23  0822   POC GLUCOSE mg/dl 93 96         Results from last 7 days   Lab Units 12/02/23  1417 12/02/23  1157   LACTIC ACID mmol/L 4.3* 3.0*   PROCALCITONIN ng/ml  --  <0.05       Lines/Drains:  Invasive Devices       Peripheral Intravenous Line  Duration             Peripheral IV 12/02/23 Right Antecubital <1 day                        Imaging: Reviewed radiology reports from this admission including: abdominal/pelvic CT and CT head  CT abdomen pelvis wo contrast   Final Result by Brian Judge MD (12/02 1240)      Extensive left colon diverticulosis but without CT evidence of diverticulitis. Numerous renal cysts. No hydronephrosis. Chronic changes of the lumbar spine. Groin hernia on the right with fat may represent a femoral hernia more likely than an inguinal hernia. Workstation performed: [id number]         CTA stroke alert (head/neck)   Final Result by Rome Reyes MD (12/02 0855)      1. No intracranial large vessel occlusion. 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 2.  No hemodynamically significant stenosis or dissection of cervical carotid and vertebral arteries. 3.  Severe multinodular thyromegaly. Correlate with prior thyroid work-up/ultrasound. Findings were directly discussed with Isaura Tejeda at 8:52 a.m.                            Workstation performed: [id number]         CT spine lumbar without contrast   Final Result by Nathanael Reyez DO (12/02 1019)      No acute fracture or traumatic malalignment. Mild degenerative changes are noted. Rightward convex scoliosis apex L3. Workstation performed: [id number]         CT stroke alert brain   Final Result by Mamta Parra MD (12/02 6936)      No acute intracranial CT abnormality. Findings were directly discussed with Dorita Vela at 8:52 a.m. Workstation performed: [id number]         XR chest portable    (Results Pending)   MRI inpatient order    (Results Pending)       EKG and Other Studies Reviewed on Admission:   EKG: No EKG obtained. ** Please Note: This note has been constructed using a voice recognition system.  **

## 2023-12-02 NOTE — ED NOTES
Patient continues to dry heave at this time - no vomiting. Son remaining at bedside.      Joe Nickerson, LAURA  12/02/23 0219

## 2023-12-03 PROBLEM — R40.4 TRANSIENT ALTERATION OF AWARENESS: Status: ACTIVE | Noted: 2023-12-03

## 2023-12-03 LAB
ANION GAP SERPL CALCULATED.3IONS-SCNC: 6 MMOL/L
BUN SERPL-MCNC: 12 MG/DL (ref 5–25)
CALCIUM SERPL-MCNC: 7.7 MG/DL (ref 8.4–10.2)
CHLORIDE SERPL-SCNC: 113 MMOL/L (ref 96–108)
CO2 SERPL-SCNC: 22 MMOL/L (ref 21–32)
CREAT SERPL-MCNC: 0.82 MG/DL (ref 0.6–1.3)
ERYTHROCYTE [DISTWIDTH] IN BLOOD BY AUTOMATED COUNT: 13.4 % (ref 11.6–15.1)
GFR SERPL CREATININE-BSD FRML MDRD: 66 ML/MIN/1.73SQ M
GLUCOSE SERPL-MCNC: 90 MG/DL (ref 65–140)
HCT VFR BLD AUTO: 39.7 % (ref 34.8–46.1)
HGB BLD-MCNC: 12.5 G/DL (ref 11.5–15.4)
MAGNESIUM SERPL-MCNC: 1.7 MG/DL (ref 1.9–2.7)
MCH RBC QN AUTO: 30 PG (ref 26.8–34.3)
MCHC RBC AUTO-ENTMCNC: 31.5 G/DL (ref 31.4–37.4)
MCV RBC AUTO: 95 FL (ref 82–98)
PLATELET # BLD AUTO: 252 THOUSANDS/UL (ref 149–390)
PMV BLD AUTO: 8.3 FL (ref 8.9–12.7)
POTASSIUM SERPL-SCNC: 3.6 MMOL/L (ref 3.5–5.3)
RBC # BLD AUTO: 4.17 MILLION/UL (ref 3.81–5.12)
SODIUM SERPL-SCNC: 141 MMOL/L (ref 135–147)
WBC # BLD AUTO: 6.93 THOUSAND/UL (ref 4.31–10.16)

## 2023-12-03 PROCEDURE — 97166 OT EVAL MOD COMPLEX 45 MIN: CPT

## 2023-12-03 PROCEDURE — 80048 BASIC METABOLIC PNL TOTAL CA: CPT

## 2023-12-03 PROCEDURE — 83735 ASSAY OF MAGNESIUM: CPT

## 2023-12-03 PROCEDURE — 99232 SBSQ HOSP IP/OBS MODERATE 35: CPT

## 2023-12-03 PROCEDURE — 85027 COMPLETE CBC AUTOMATED: CPT

## 2023-12-03 PROCEDURE — G0426 INPT/ED TELECONSULT50: HCPCS | Performed by: PSYCHIATRY & NEUROLOGY

## 2023-12-03 RX ORDER — MAGNESIUM SULFATE HEPTAHYDRATE 40 MG/ML
2 INJECTION, SOLUTION INTRAVENOUS ONCE
Status: COMPLETED | OUTPATIENT
Start: 2023-12-03 | End: 2023-12-03

## 2023-12-03 RX ORDER — OLANZAPINE 10 MG/2ML
5 INJECTION, POWDER, FOR SOLUTION INTRAMUSCULAR ONCE
Status: COMPLETED | OUTPATIENT
Start: 2023-12-03 | End: 2023-12-03

## 2023-12-03 RX ORDER — CYANOCOBALAMIN 1000 UG/ML
1000 INJECTION, SOLUTION INTRAMUSCULAR; SUBCUTANEOUS DAILY
Status: DISCONTINUED | OUTPATIENT
Start: 2023-12-04 | End: 2023-12-05 | Stop reason: HOSPADM

## 2023-12-03 RX ADMIN — ACETAMINOPHEN 975 MG: 325 TABLET, FILM COATED ORAL at 23:38

## 2023-12-03 RX ADMIN — HEPARIN SODIUM 5000 UNITS: 5000 INJECTION INTRAVENOUS; SUBCUTANEOUS at 23:38

## 2023-12-03 RX ADMIN — KETOROLAC TROMETHAMINE 15 MG: 30 INJECTION, SOLUTION INTRAMUSCULAR; INTRAVENOUS at 06:01

## 2023-12-03 RX ADMIN — LIDOCAINE 5% 1 PATCH: 700 PATCH TOPICAL at 09:54

## 2023-12-03 RX ADMIN — HEPARIN SODIUM 5000 UNITS: 5000 INJECTION INTRAVENOUS; SUBCUTANEOUS at 06:01

## 2023-12-03 RX ADMIN — ACETAMINOPHEN 975 MG: 325 TABLET, FILM COATED ORAL at 06:01

## 2023-12-03 RX ADMIN — OLANZAPINE 5 MG: 10 INJECTION, POWDER, FOR SOLUTION INTRAMUSCULAR at 02:15

## 2023-12-03 RX ADMIN — ACETAMINOPHEN 975 MG: 325 TABLET, FILM COATED ORAL at 17:46

## 2023-12-03 RX ADMIN — HEPARIN SODIUM 5000 UNITS: 5000 INJECTION INTRAVENOUS; SUBCUTANEOUS at 16:14

## 2023-12-03 RX ADMIN — MAGNESIUM SULFATE HEPTAHYDRATE 2 G: 40 INJECTION, SOLUTION INTRAVENOUS at 09:55

## 2023-12-03 NOTE — ASSESSMENT & PLAN NOTE
History of degenerative disc disease with worsening pain since morning of admission when getting out of bed  Lumbar CT showing no acute fracture or malalignment.  Chronic degenerative changes   History of bowel incontinence and chronic diarrhea  MRI lumbar spine pending to rule out cord compression  Pain management - heat, toradol, tylenol, and lidoderm patch  PT/OT consulted

## 2023-12-03 NOTE — ASSESSMENT & PLAN NOTE
POA with slow speech and poor mentation per neighbor/friend. Also with worsening back pain since morning of admission. Son states patient has history of AMS when "traumatic" events occur, and attributes current mentation to severe back pain  Neuro consulted and recommending head ct and CTA. Recommending MRI head and EEG due to AMS and still not at baseline  TSH, UDS, ethanol level, crp, esr normal  UA and procal negative. Afebrile and without leukocytosis  Lactic mildly elevated, given 1L bolus and started on IVF continuous   Flu/covid/rsv negative  C diff and stool samples pending 2/2 diarrhea  Discontinued as patient without any further diarrhea  Psych consulted due to very anxious and tearful exam with poor mentation  CT AP and CXR without infectious process  Infectious workup negative thus far  B12 slightly low, will do 5 days IM injection follows by oral and repeat labs OP in 1 month  Neurology: b12 low, should be around 400 - recommend 1000 mcg injections x 5 days and then po tabs or weekly/monthly b12 injections outpatient with PCP. 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 EEG: Left temporal irregular delta activity. This abnormal study is consistent with left temporal non-specific cerebral dysfunction. No electrographic seizures or interictal epileptiform discharges (seizure tendency) were seen. No diagnostic clinical events were captured. Neuro recommending stroke protocol  Continue to reorient as needed.  Delirium precautions

## 2023-12-03 NOTE — PLAN OF CARE
Problem: Potential for Falls  Goal: Patient will remain free of falls  Description: INTERVENTIONS:  - Educate patient/family on patient safety including physical limitations  - Instruct patient to call for assistance with activity   - Consult OT/PT to assist with strengthening/mobility   - Keep Call bell within reach  - Keep bed low and locked with side rails adjusted as appropriate  - Keep care items and personal belongings within reach  - Initiate and maintain comfort rounds  - Make Fall Risk Sign visible to staff  - Offer Toileting every  Hours, in advance of need  - Initiate/Maintain alarm  - Obtain necessary fall risk management equipment:   - Apply yellow socks and bracelet for high fall risk patients  - Consider moving patient to room near nurses station  Outcome: Progressing     Problem: PAIN - ADULT  Goal: Verbalizes/displays adequate comfort level or baseline comfort level  Description: Interventions:  - Encourage patient to monitor pain and request assistance  - Assess pain using appropriate pain scale  - Administer analgesics based on type and severity of pain and evaluate response  - Implement non-pharmacological measures as appropriate and evaluate response  - Consider cultural and social influences on pain and pain management  - Notify physician/advanced practitioner if interventions unsuccessful or patient reports new pain  Outcome: Progressing     Problem: INFECTION - ADULT  Goal: Absence or prevention of progression during hospitalization  Description: INTERVENTIONS:  - Assess and monitor for signs and symptoms of infection  - Monitor lab/diagnostic results  - Monitor all insertion sites, i.e. indwelling lines, tubes, and drains  - Monitor endotracheal if appropriate and nasal secretions for changes in amount and color  - [place] appropriate cooling/warming therapies per order  - Administer medications as ordered  - Instruct and encourage patient and family to use good hand hygiene technique  - Identify and instruct in appropriate isolation precautions for identified infection/condition  Outcome: Progressing  Goal: Absence of fever/infection during neutropenic period  Description: INTERVENTIONS:  - Monitor WBC    Outcome: Progressing     Problem: SAFETY ADULT  Goal: Patient will remain free of falls  Description: INTERVENTIONS:  - Educate patient/family on patient safety including physical limitations  - Instruct patient to call for assistance with activity   - Consult OT/PT to assist with strengthening/mobility   - Keep Call bell within reach  - Keep bed low and locked with side rails adjusted as appropriate  - Keep care items and personal belongings within reach  - Initiate and maintain comfort rounds  - Make Fall Risk Sign visible to staff  - Offer Toileting every  Hours, in advance of need  - Initiate/Maintain alarm  - Obtain necessary fall risk management equipment:   - Apply yellow socks and bracelet for high fall risk patients  - Consider moving patient to room near nurses station  Outcome: Progressing  Goal: Maintain or return to baseline ADL function  Description: INTERVENTIONS:  -  Assess patient's ability to carry out ADLs; assess patient's baseline for ADL function and identify physical deficits which impact ability to perform ADLs (bathing, care of mouth/teeth, toileting, grooming, dressing, etc.)  - Assess/evaluate cause of self-care deficits   - Assess range of motion  - Assess patient's mobility; develop plan if impaired  - Assess patient's need for assistive devices and provide as appropriate  - Encourage maximum independence but intervene and supervise when necessary  - Involve family in performance of ADLs  - Assess for home care needs following discharge   - Consider OT consult to assist with ADL evaluation and planning for discharge  - Provide patient education as appropriate  Outcome: Progressing  Goal: Maintains/Returns to pre admission functional level  Description: INTERVENTIONS:  - Perform AM-PAC 6 Click Basic Mobility/ Daily Activity assessment daily.  - Set and communicate daily mobility goal to care team and patient/family/caregiver. - Collaborate with rehabilitation services on mobility goals if consulted  - Perform Range of Motion  times a day. - Reposition patient every hours. - Dangle patient  times a day  - Stand patient  times a day  - Ambulate patient  times a day  - Out of bed to chair  times a day   - Out of bed for meals  times a day  - Out of bed for toileting  - Record patient progress and toleration of activity level   Outcome: Progressing     Problem: DISCHARGE PLANNING  Goal: Discharge to home or other facility with appropriate resources  Description: INTERVENTIONS:  - Identify barriers to discharge w/patient and caregiver  - Arrange for needed discharge resources and transportation as appropriate  - Identify discharge learning needs (meds, wound care, etc.)  - Arrange for interpretive services to assist at discharge as needed  - Refer to Case Management Department for coordinating discharge planning if the patient needs post-hospital services based on physician/advanced practitioner order or complex needs related to functional status, cognitive ability, or social support system  Outcome: Progressing     Problem: Knowledge Deficit  Goal: Patient/family/caregiver demonstrates understanding of disease process, treatment plan, medications, and discharge instructions  Description: Complete learning assessment and assess knowledge base.   Interventions:  - Provide teaching at level of understanding  - Provide teaching via preferred learning methods  Outcome: Progressing     Problem: Prexisting or High Potential for Compromised Skin Integrity  Goal: Skin integrity is maintained or improved  Description: INTERVENTIONS:  - Identify patients at risk for skin breakdown  - Assess and monitor skin integrity  - Assess and monitor nutrition and hydration status  - Monitor labs   - Assess for incontinence   - Turn and reposition patient  - Assist with mobility/ambulation  - Relieve pressure over bony prominences  - Avoid friction and shearing  - Provide appropriate hygiene as needed including keeping skin clean and dry  - Evaluate need for skin moisturizer/barrier cream  - Collaborate with interdisciplinary team   - Patient/family teaching  - Consider wound care consult   Outcome: Progressing

## 2023-12-03 NOTE — ASSESSMENT & PLAN NOTE
Lab Results   Component Value Date    EGFR 66 12/03/2023    EGFR 76 12/02/2023    EGFR 67 12/02/2023    CREATININE 0.82 12/03/2023    CREATININE 0.73 12/02/2023    CREATININE 0.81 12/02/2023   Currently at baseline   Avoid nephrotoxic agent and hypotension

## 2023-12-03 NOTE — OCCUPATIONAL THERAPY NOTE
Occupational Therapy Evaluation     Patient Name: Yg Milton  [id number] Date: 12/3/2023  Problem List  Principal Problem:    Acute metabolic encephalopathy  Active Problems:    Chronic low back pain    Stage 3a chronic kidney disease (CKD) (HCC)    Past Medical History  Past Medical History:   Diagnosis Date    Anxiety     Arthritis     Depression     Microscopic hematuria     Renal insufficiency      Past Surgical History  Past Surgical History:   Procedure Laterality Date    ARTHROSCOPY KNEE      CHOLECYSTECTOMY      MENISCECTOMY          12/03/23 1030   Note Type   Note type Evaluation   Pain Assessment   Pain Assessment Tool 0-10   Pain Score No Pain   Home Living   Type of Home House  ([facility])   Home Layout Two level;Bed/bath upstairs  ([facility])   Bathroom Shower/Tub Tub/shower unit   [employer] Raised   Bathroom Equipment Grab bars around toilet; Shower chair   Home Equipment Cane   Additional Comments Pt reports living in a [address] alone. SPC use PRN for mobility. Prior Function   Level of [place] Independent with ADLs; Independent with functional mobility; Independent with IADLS   Lives With Alone   Receives Help From Family   IADLs Independent with driving; Independent with meal prep; Independent with medication management   Falls in the last 6 months 0   Comments Son, Michelle Balbuena present for evaluation and confirming details   ADL   UB Dressing Assistance 5  Supervision/Setup   UB Dressing Deficit Setup; Increased time to complete   LB Dressing Assistance 4  Minimal Assistance   LB Dressing Deficit Setup;Verbal cueing; Increased time to complete   Additional Comments UB ADLs @ S/set-up while seated at EOB. LB ADLs @ Min A. Pt able thread RLE into brief, asssit required for LLE. SBA for clothing management while standing with no AD. Bed Mobility   Supine to Sit 5  Supervision   Additional items HOB elevated; Increased time required   Sit to Supine 5  Supervision   Additional items [facility] elevated; Increased time required   Additional Comments Pt supine in bed at beginning of session. Supine <> sit @ S with HOB elevated. Pt supine in bed at end of session with bed alarm intact, call bell within reach and all needs met. Transfers   Sit to Stand   (SBA)   Additional items Increased time required;Verbal cues   Stand to Sit   (SBA)   Additional items Increased time required;Verbal cues   Stand pivot   (Steadying assist)   Additional items Assist x 1; Increased time required;Verbal cues   Additional Comments STS from EOB with no AD @ SBA. Pt able to complete short distance mobility around room with no AD @ Steadying assist, reaching out for furniture or something to hold. Pt advised to utilize cane upon return to home. Balance   Static Sitting Good   Dynamic Sitting Fair +   Static Standing Fair   Dynamic Standing Fair -   Activity Tolerance   Activity Tolerance Patient tolerated treatment well   Medical Staff Made Aware Spoke with GRACIELA Price   RUE Assessment   RUE Assessment WFL   LUE Assessment   LUE Assessment WFL   Hand Function   Gross Motor Coordination Functional   Fine Motor Coordination Functional   Cognition   Overall Cognitive Status WFL   Arousal/Participation Alert; Responsive; Cooperative   Attention Within functional limits   Orientation Level Oriented X4   Memory Decreased recall of recent events   Following Commands Follows one step commands without difficulty   Assessment   Limitation Decreased ADL status; Decreased UE strength;Decreased endurance;Decreased Safe judgement during ADL;Decreased self-care trans;Decreased high-level ADLs   Prognosis Good   Assessment Pt is a 80 y.o. female, admitted to [facility] 12/2/2023 d/t experiencing back pain. Dx: acute metabolic encephalopathy. Pt with PMHx impacting their performance during ADL tasks, including: anxiety, arthritis, depression, microscopic hematuria, renal insufficiency.  Prior to admission to the hospital Pt was performing ADLs without physical assistance. IADLs without physical assistance. Functional transfers/ambulation without physical assistance. Cognitive status was PTA was Intact. OT order placed to assess Pt's ADLs, cognitive status, and performance during functional tasks in order to maximize safety and independence while making most appropriate d/c recommendations. Pt's clinical presentation is currently evolving given new onset deficits that effect Pt's occupational performance and ability to safely return to PLOF including decrease activity tolerance, decrease standing balance, decrease performance during ADL tasks, decrease safety awareness , decrease UB MS, decrease generalized strength, and decrease performance during functional transfers combined with medical complications of abnormal CBC and need for input for mobility technique/safety. Personal factors affecting Pt at time of initial evaluation include: step(s) to enter environment, multi-level environment, limited home support, advanced age, and limited insight into impairments. Pt will benefit from continued skilled OT services to address deficits as defined above and to maximize level independence/participation during ADLs and functional tasks to facilitate return toward PLOF and improved quality of life. From an occupational therapy standpoint, recommendation at time of d/c would be Level III: Minimum Resource Intensity Therapy. Plan   Treatment Interventions ADL retraining;Visual perceptual retraining;Functional transfer training;UE strengthening/ROM; Cognitive reorientation; Endurance training;Equipment evaluation/education;Patient/family training;Neuromuscular reeducation;UE splinting;Fine motor coordination activities; Compensatory technique education;Continued evaluation;Cardiac education; Activityengagement; Energy conservation   Goal Expiration Date 12/17/23   OT Frequency 2-3x/wk   Discharge Recommendation   Rehab Resource Intensity Level, OT III (Minimum Resource Intensity)   Additional Comments  (S)  Recommend BSC upon return home as pt has no bathroom on 1st floor and is a potential fall/safety risk to complete stairs throughout day d/t decreased standing balance tolerance and safety awareness. AM-PAC Daily Activity Inpatient   Lower Body Dressing 3   Bathing 3   Toileting 3   Upper Body Dressing 3   Grooming 3   Eating 4   Daily Activity Raw Score 19   Daily Activity Standardized Score (Calc for Raw Score >=11) 40.22   AM-PAC Applied Cognition Inpatient   Following a Speech/Presentation 3   Understanding Ordinary Conversation 4   Taking Medications 3   Remembering Where Things Are Placed or Put Away 4   Remembering List of 4-5 Errands 2   Taking Care of Complicated Tasks 2   Applied Cognition Raw Score 18   Applied Cognition Standardized Score 38.07     The patient's raw score on the AM-PAC Daily Activity Inpatient Short Form is 19. A raw score of greater than or equal to 19 suggests the patient may benefit from discharge to home. Please refer to the recommendation of the Occupational Therapist for safe discharge planning. Pt goals to be met by 12/17/2023    Pt will demonstrate ability to complete grooming/hygiene tasks @ Mod I after set-up. Pt will demonstrate ability to complete supine<>sit @ Mod I in order to increase safety and independence during ADL tasks. Pt will demonstrate ability to complete UB ADLs including washing/dressing @ Mod I in order to increase performance and participation during meaningful tasks  Pt will demonstrate ability to complete LB dressing @ Mod I in order to increase safety and independence during meaningful tasks. Pt will demonstrate ability to fransisco/doff socks/shoes while sitting EOB @ Mod I in order to increase safety and independence during meaningful tasks. Pt will demonstrate ability to complete toileting tasks including CM and pericare @ Mod I in order to increase safety and independence during meaningful tasks.   Pt will demonstrate ability to complete EOB, chair, toilet/commode transfers @ Mod I in order to increase performance and participation during functional tasks. Pt will demonstrate ability to stand for 5-8 minutes while maintaining Fair + balance with use of LRAD for UB support PRN. Pt will demonstrate ability to tolerate 30-35 minute OT session with no vc'ing for deep breathing or use of energy conservation techniques in order to increase activity tolerance during functional tasks. Pt will demonstrate Good carryover of use of energy conservation/compensatory strategies during ADLs and functional tasks in order to increase safety and reduce risk for falls. Pt will demonstrate Good attention and participation in continued evaluation of functional ambulation house hold distances in order to assist with safe d/c planning. Pt will attend to continued cognitive assessments 100% of the time in order to provide most appropriate d/c recommendations. Pt will follow 100% simple 2-step commands and be A&O x4 consistently with environmental cues to increase participation in functional activities. Pt will identify 3 areas of interest/hobbies and 1 intervention on how to incorporate into daily life in order to increase interaction with environment and peers as well as increase participation in meaningful tasks. Pt will demonstrate 100% carryover of BUE HEP in order to increase BUE MS and increase performance during functional tasks upon d/c home.     Lisa José, OTR/L

## 2023-12-03 NOTE — NURSING NOTE
Patient restless and impulsive throughout night, confusion notable. Repetitive phrases "I don't feel good", unable to elaborate. Administered Zofran as ordered, ineffective. Notified CRNP order received for IM Tigan, administered as ordered, effective for nausea. At approximately 0200 patient removed IV and attempted to get OOB unassisted, sounding bed alarm. Noted patient with IV in hand stating "What's this?" CRNP notified, orders received and noted.

## 2023-12-03 NOTE — PLAN OF CARE
Problem: Potential for Falls  Goal: Patient will remain free of falls  Description: INTERVENTIONS:  - Educate patient/family on patient safety including physical limitations  - Instruct patient to call for assistance with activity   - Consult OT/PT to assist with strengthening/mobility   - Keep Call bell within reach  - Keep bed low and locked with side rails adjusted as appropriate  - Keep care items and personal belongings within reach  - Initiate and maintain comfort rounds  - Make Fall Risk Sign visible to staff  - Offer Toileting every 2 Hours, in advance of need  - Initiate/Maintain bed/ chair alarm  - Obtain necessary fall risk management equipment:   - Apply yellow socks and bracelet for high fall risk patients  - Consider moving patient to room near nurses station  Outcome: Progressing     Problem: PAIN - ADULT  Goal: Verbalizes/displays adequate comfort level or baseline comfort level  Description: Interventions:  - Encourage patient to monitor pain and request assistance  - Assess pain using appropriate pain scale  - Administer analgesics based on type and severity of pain and evaluate response  - Implement non-pharmacological measures as appropriate and evaluate response  - Consider cultural and social influences on pain and pain management  - Notify physician/advanced practitioner if interventions unsuccessful or patient reports new pain  Outcome: Progressing     Problem: INFECTION - ADULT  Goal: Absence or prevention of progression during hospitalization  Description: INTERVENTIONS:  - Assess and monitor for signs and symptoms of infection  - Monitor lab/diagnostic results  - Monitor all insertion sites, i.e. indwelling lines, tubes, and drains  - Monitor endotracheal if appropriate and nasal secretions for changes in amount and color  - [place] appropriate cooling/warming therapies per order  - Administer medications as ordered  - Instruct and encourage patient and family to use good hand hygiene technique  - Identify and instruct in appropriate isolation precautions for identified infection/condition  Outcome: Progressing  Goal: Absence of fever/infection during neutropenic period  Description: INTERVENTIONS:  - Monitor WBC    Outcome: Progressing     Problem: SAFETY ADULT  Goal: Patient will remain free of falls  Description: INTERVENTIONS:  - Educate patient/family on patient safety including physical limitations  - Instruct patient to call for assistance with activity   - Consult OT/PT to assist with strengthening/mobility   - Keep Call bell within reach  - Keep bed low and locked with side rails adjusted as appropriate  - Keep care items and personal belongings within reach  - Initiate and maintain comfort rounds  - Make Fall Risk Sign visible to staff  - Offer Toileting every 2 Hours, in advance of need  - Initiate/Maintain bed/chair alarm  - Obtain necessary fall risk management equipment:   - Apply yellow socks and bracelet for high fall risk patients  - Consider moving patient to room near nurses station  Outcome: Progressing  Goal: Maintain or return to baseline ADL function  Description: INTERVENTIONS:  -  Assess patient's ability to carry out ADLs; assess patient's baseline for ADL function and identify physical deficits which impact ability to perform ADLs (bathing, care of mouth/teeth, toileting, grooming, dressing, etc.)  - Assess/evaluate cause of self-care deficits   - Assess range of motion  - Assess patient's mobility; develop plan if impaired  - Assess patient's need for assistive devices and provide as appropriate  - Encourage maximum independence but intervene and supervise when necessary  - Involve family in performance of ADLs  - Assess for home care needs following discharge   - Consider OT consult to assist with ADL evaluation and planning for discharge  - Provide patient education as appropriate  Outcome: Progressing  Goal: Maintains/Returns to pre admission functional level  Description: INTERVENTIONS:  - Perform AM-PAC 6 Click Basic Mobility/ Daily Activity assessment daily.  - Set and communicate daily mobility goal to care team and patient/family/caregiver. - Collaborate with rehabilitation services on mobility goals if consulted  - Perform Range of Motion 4 times a day. - Reposition patient every 2 hours. - Dangle patient 2 times a day  - Stand patient 2 times a day  - Ambulate patient 2 times a day  - Out of bed to chair 2 times a day   - Out of bed for meals 2 times a day  - Out of bed for toileting  - Record patient progress and toleration of activity level   Outcome: Progressing     Problem: DISCHARGE PLANNING  Goal: Discharge to home or other facility with appropriate resources  Description: INTERVENTIONS:  - Identify barriers to discharge w/patient and caregiver  - Arrange for needed discharge resources and transportation as appropriate  - Identify discharge learning needs (meds, wound care, etc.)  - Arrange for interpretive services to assist at discharge as needed  - Refer to Case Management Department for coordinating discharge planning if the patient needs post-hospital services based on physician/advanced practitioner order or complex needs related to functional status, cognitive ability, or social support system  Outcome: Progressing     Problem: Knowledge Deficit  Goal: Patient/family/caregiver demonstrates understanding of disease process, treatment plan, medications, and discharge instructions  Description: Complete learning assessment and assess knowledge base.   Interventions:  - Provide teaching at level of understanding  - Provide teaching via preferred learning methods  Outcome: Progressing     Problem: Prexisting or High Potential for Compromised Skin Integrity  Goal: Skin integrity is maintained or improved  Description: INTERVENTIONS:  - Identify patients at risk for skin breakdown  - Assess and monitor skin integrity  - Assess and monitor nutrition and hydration status  - Monitor labs   - Assess for incontinence   - Turn and reposition patient  - Assist with mobility/ambulation  - Relieve pressure over bony prominences  - Avoid friction and shearing  - Provide appropriate hygiene as needed including keeping skin clean and dry  - Evaluate need for skin moisturizer/barrier cream  - Collaborate with interdisciplinary team   - Patient/family teaching  - Consider wound care consult   Outcome: Progressing

## 2023-12-03 NOTE — ASSESSMENT & PLAN NOTE
POA with slow speech and poor mentation per neighbor/friend. Also with worsening back pain since morning of admission. Son states patient has history of AMS when "traumatic" events occur, and attributes current mentation to severe back pain  Neuro consulted and recommending head ct and CTA. Recommending MRI head and EEG due to AMS and still not at baseline  TSH, UDS, ethanol level, crp, esr normal  UA and procal negative. Afebrile and without leukocytosis  Lactic mildly elevated, given 1L bolus and started on IVF continuous   Flu/covid/rsv negative  C diff and stool samples pending 2/2 diarrhea  Psych consulted due to very anxious and tearful exam with poor mentation  CT AP and CXR without infectious process  Infectious workup negative thus far  Continue to reorient as needed.  Delirium precautions

## 2023-12-03 NOTE — PLAN OF CARE
Problem: OCCUPATIONAL THERAPY ADULT  Goal: Performs self-care activities at highest level of function for planned discharge setting. See evaluation for individualized goals. Description: Treatment Interventions: ADL retraining, Visual perceptual retraining, Functional transfer training, UE strengthening/ROM, Cognitive reorientation, Endurance training, Equipment evaluation/education, Patient/family training, Neuromuscular reeducation, UE splinting, Fine motor coordination activities, Compensatory technique education, Continued evaluation, Cardiac education, Activityengagement, Energy conservation          See flowsheet documentation for full assessment, interventions and recommendations. Note: Limitation: Decreased ADL status, Decreased UE strength, Decreased endurance, Decreased Safe judgement during ADL, Decreased self-care trans, Decreased high-level ADLs  Prognosis: Good  Assessment: Pt is a 80 y.o. female, admitted to [address] [place] [address] 12/2/2023 d/t experiencing back pain. Dx: acute metabolic encephalopathy. Pt with PMHx impacting their performance during ADL tasks, including: anxiety, arthritis, depression, microscopic hematuria, renal insufficiency. Prior to admission to the hospital Pt was performing ADLs without physical assistance. IADLs without physical assistance. Functional transfers/ambulation without physical assistance. Cognitive status was PTA was Intact. OT order placed to assess Pt's ADLs, cognitive status, and performance during functional tasks in order to maximize safety and independence while making most appropriate d/c recommendations.  Pt's clinical presentation is currently evolving given new onset deficits that effect Pt's occupational performance and ability to safely return to PLOF including decrease activity tolerance, decrease standing balance, decrease performance during ADL tasks, decrease safety awareness , decrease UB MS, decrease generalized strength, and decrease performance during functional transfers combined with medical complications of abnormal CBC and need for input for mobility technique/safety. Personal factors affecting Pt at time of initial evaluation include: step(s) to enter environment, multi-level environment, limited home support, advanced age, and limited insight into impairments. Pt will benefit from continued skilled OT services to address deficits as defined above and to maximize level independence/participation during ADLs and functional tasks to facilitate return toward PLOF and improved quality of life. From an occupational therapy standpoint, recommendation at time of d/c would be Level III: Minimum Resource Intensity Therapy.      Rehab Resource Intensity Level, OT: III (Minimum Resource Intensity)

## 2023-12-03 NOTE — CONSULTS
TeleConsultation - Neurology   Cody Hamilton 80 y.o. female MRN: [id number]   Encounter: [id number]      REQUIRED DOCUMENTATION:     1. This service was provided via Telemedicine. 2. Provider located in [facility]. 3. TeleMed provider: Boo Garcia MD.  4. Identify all parties in room with patient during tele consult:  RN, and then son Mireille Connors  5. After connecting through televideo, patient was identified by name and date of birth and assistant checked wristband. Patient was then informed that this was a Telemedicine visit and that the exam was being conducted confidentially over secure lines. My office door was closed. No one else was in the room. Patient acknowledged consent and understanding of privacy and security of the Telemedicine visit, and gave us permission to have the assistant stay in the room in order to assist with the history and to conduct the exam.  I informed the patient that I have reviewed their record in Epic and presented the opportunity for them to ask any questions regarding the visit today. The patient agreed to participate. Assessment/Plan   Assessment:  Episode of disassociation (lack of orientation and memory lapses), started after presentation for acute back pain, in setting of apparent emotional stress. CT and CTA show no acute findings. Maybe anxiety provoked. However obtaining MRI brain is reasonable to exclude stroke, as is obtaining EEG to exclude encephalopathy (normal background rhythm will exclude any degree of encephalopathy)      History of Present Illness     Reason for Consult / Principal Problem: confusion  Hx and PE limited by: none  HPI: Cody Hamilton is a 80 y.o. female who presents with back pain. She was noted by the ED physician to be extremely anxious and emotional during the encounter. After he stepped out of the room, he was called back as she was having difficulties answering questions.  However he noted that she was not consistent, having difficulties when asked directly (what is this object?) but can talk fluently when talking to her friend in the room for example. As for now, patient states that she does not know where she is, and she did not think she was in the hospital. She is oriented to time and self. Son states that patient had a similar event happened 15+ years ago on the day of her 's passing. She has anxiety but is not treated. However he states that he has not witnessed any event like that since then. He believes she is much better today compared to yesterday where she was not talking. Inpatient consult to Neurology  Consult performed by: Shane Charles MD  Consult ordered by: Manuel Garcia DO          Review of Systems  Complete ROS was done and is negative other than what is mentioned in HPI    Historical Information   Past Medical History:   Diagnosis Date    Anxiety     Arthritis     Depression     Microscopic hematuria     Renal insufficiency      Past Surgical History:   Procedure Laterality Date    ARTHROSCOPY KNEE      CHOLECYSTECTOMY      MENISCECTOMY       Social History   Social History     Substance and Sexual Activity   Alcohol Use Never     Social History     Substance and Sexual Activity   Drug Use Never     Social History     Tobacco Use   Smoking Status Never   Smokeless Tobacco Never     Family History: non-contributory    Review of previous medical records was completed.      Meds/Allergies   current meds:   Current Facility-Administered Medications   Medication Dose Route Frequency    acetaminophen (TYLENOL) tablet 975 mg  975 mg Oral Q6H    heparin (porcine) subcutaneous injection 5,000 Units  5,000 Units Subcutaneous Q8H [address]    HYDROmorphone HCl (DILAUDID) injection 0.2 mg  0.2 mg Intravenous Q6H PRN    ketorolac (TORADOL) injection 15 mg  15 mg Intravenous Q6H [address]    lidocaine (LIDODERM) 5 % patch 1 patch  1 patch Topical Daily    magnesium sulfate 2 g/50 mL IVPB (premix) 2 g  2 g Intravenous Once ondansetron (ZOFRAN) injection 4 mg  4 mg Intravenous Q6H PRN    oxyCODONE (ROXICODONE) IR tablet 5 mg  5 mg Oral Q6H PRN    sodium chloride 0.9 % infusion  125 mL/hr Intravenous Continuous       Allergies   Allergen Reactions    Levofloxacin Rash     Itching,nausea    Shellfish-Derived Products - Food Allergy Swelling    Moxifloxacin Hives, Rash and Swelling    Penicillins Rash     hives       Objective   Vitals:Blood pressure 115/58, pulse 67, temperature 97.5 °F (36.4 °C), resp. rate 17, height 5' 6" (1.676 m), weight 88 kg (194 lb 0.1 oz), SpO2 96 %. ,Body mass index is 31.31 kg/m². Physical Exam NAD  Skin: no seen lesions  HEENT: ATNC  Chest: breathing comfortably on room air  GI: no apparent distension. Neurologic Exam  Alert and oriented for self, and time but not place. Memory is impaired to certain events like what happened on 9/11. Language is intact to comprehension and expression. No neglect. Speech is normal. EOMI. Pupils are symmetric. Visual field appears intact. Face is symmetric. Tongue is midline. Able to move all extremities against gravity. No dysmetria noted.       Lab Results: CBC:   Results from last 7 days   Lab Units 12/03/23  0555 12/02/23  0827   WBC Thousand/uL 6.93 8.64   RBC Million/uL 4.17 5.21*   HEMOGLOBIN g/dL 12.5 15.4   HEMATOCRIT % 39.7 48.1*   MCV fL 95 92   PLATELETS Thousands/uL 252 350   , BMP/CMP:   Results from last 7 days   Lab Units 12/03/23  0555 12/02/23  1758 12/02/23  0827   SODIUM mmol/L 141 137 141   POTASSIUM mmol/L 3.6 3.9 3.5   CHLORIDE mmol/L 113* 108 108   CO2 mmol/L 22 20* 23   BUN mg/dL 12 14 18   CREATININE mg/dL 0.82 0.73 0.81   CALCIUM mg/dL 7.7* 8.0* 9.5   AST U/L  --   --  17   ALT U/L  --   --  11   ALK PHOS U/L  --   --  59   EGFR ml/min/1.73sq m 66 76 67   , Vitamin B12:   Results from last 7 days   Lab Units 12/02/23  1329   VITAMIN B 12 pg/mL 180   , HgBA1C:   , TSH:   Results from last 7 days   Lab Units 12/02/23  1534   TSH 3RD GENERATON uIU/mL 0.472   , Coagulation:   Results from last 7 days   Lab Units 12/02/23  0853   INR  0.97   , Lipid Profile:   , Ammonia:   Results from last 7 days   Lab Units 12/02/23  1329   AMMONIA umol/L 16*     Imaging Studies: I have personally reviewed pertinent films in PACS with a Radiologist.  EKG, Pathology, and Other Studies: I have personally reviewed pertinent reports.     VTE Prophylaxis: Sequential compression device Jeri Miller)     Code Status: Level 3 - DNAR and DNI  Advance Directive and Living Will:      Power of :    POLST:      Counseling / Coordination of Care  N/A

## 2023-12-03 NOTE — PROGRESS NOTES
[address] [facility]  Progress Note  Name: Eduar Nickerson  MRN: [id number]  Unit/Bed#: -01 I Date of Admission: 12/2/2023   Date of Service: 12/3/2023 I Hospital Day: 1    Assessment/Plan   * Acute metabolic encephalopathy  Assessment & Plan  POA with slow speech and poor mentation per neighbor/friend. Also with worsening back pain since morning of admission. Son states patient has history of AMS when "traumatic" events occur, and attributes current mentation to severe back pain  Neuro consulted and recommending head ct and CTA. Recommending MRI head and EEG due to AMS and still not at baseline  TSH, UDS, ethanol level, crp, esr normal  UA and procal negative. Afebrile and without leukocytosis  Lactic mildly elevated, given 1L bolus and started on IVF continuous   Flu/covid/rsv negative  C diff and stool samples pending 2/2 diarrhea  Psych consulted due to very anxious and tearful exam with poor mentation  CT AP and CXR without infectious process  Infectious workup negative thus far  Continue to reorient as needed. Delirium precautions    Stage 3a chronic kidney disease (CKD) [facility])  Assessment & Plan  Lab Results   Component Value Date    EGFR 66 12/03/2023    EGFR 76 12/02/2023    EGFR 67 12/02/2023    CREATININE 0.82 12/03/2023    CREATININE 0.73 12/02/2023    CREATININE 0.81 12/02/2023   Currently at baseline   Avoid nephrotoxic agent and hypotension    Chronic low back pain  Assessment & Plan  History of degenerative disc disease with worsening pain since morning of admission when getting out of bed  Lumbar CT showing no acute fracture or malalignment.  Chronic degenerative changes   History of bowel incontinence and chronic diarrhea  MRI lumbar spine pending to rule out cord compression  Pain management - heat, toradol, tylenol, and lidoderm patch  PT/OT consulted              VTE Pharmacologic Prophylaxis:   Moderate Risk (Score 3-4) - Pharmacological DVT Prophylaxis Ordered: heparin. Mobility:   Basic Mobility Inpatient Raw Score: 18  JH-HLM Goal: 6: Walk 10 steps or more  JH-HLM Achieved: 6: Walk 10 steps or more  HLM Goal achieved. Continue to encourage appropriate mobility. Patient Centered Rounds: I performed bedside rounds with nursing staff today. Discussions with Specialists or Other Care Team Provider: nursing    Education and Discussions with Family / Patient: Updated  (son) at bedside. Total Time Spent on Date of Encounter in care of patient: This time was spent on one or more of the following: performing physical exam; counseling and coordination of care; obtaining or reviewing history; documenting in the medical record; reviewing/ordering tests, medications or procedures; communicating with other healthcare professionals and discussing with patient's family/caregivers. Current Length of Stay: 1 day(s)  Current Patient Status: Inpatient   Certification Statement: The patient will continue to require additional inpatient hospital stay due to altered mental status with severe lumbar back pain  Discharge Plan: Anticipate discharge in 24-48 hrs to discharge location to be determined pending rehab evaluations. Code Status: Level 3 - DNAR and DNI    Subjective:   Patient states that her back pain is being controlled with pain medications. Denies chest pain or shortness of breath. Oriented to self, family, place and year but still very forgetful    Objective:     Vitals:   Temp (24hrs), Av.8 °F (36.6 °C), Min:97.3 °F (36.3 °C), Max:98.6 °F (37 °C)    Temp:  [97.3 °F (36.3 °C)-98.6 °F (37 °C)] 97.5 °F (36.4 °C)  HR:  [67-98] 67  Resp:  [17-22] 17  BP: (106-172)/(58-78) 115/58  SpO2:  [90 %-99 %] 96 %  Body mass index is 31.31 kg/m². Input and Output Summary (last 24 hours):      Intake/Output Summary (Last 24 hours) at 12/3/2023 1053  Last data filed at 12/3/2023 0601  Gross per 24 hour   Intake --   Output 750 ml   Net -750 ml       Physical Exam:   Physical Exam  Vitals reviewed. Constitutional:       General: She is not in acute distress. Appearance: Normal appearance. She is obese. She is not ill-appearing. HENT:      Head: Normocephalic and atraumatic. Nose: Nose normal.      Mouth/Throat:      Mouth: Mucous membranes are moist.      Pharynx: Oropharynx is clear. Eyes:      Extraocular Movements: Extraocular movements intact. Conjunctiva/sclera: Conjunctivae normal.   Cardiovascular:      Rate and Rhythm: Normal rate and regular rhythm. Pulses: Normal pulses. Heart sounds: Normal heart sounds. No murmur heard. Pulmonary:      Effort: Pulmonary effort is normal. No respiratory distress. Breath sounds: Normal breath sounds. No wheezing or rhonchi. Abdominal:      General: Abdomen is flat. Bowel sounds are normal. There is no distension. Palpations: Abdomen is soft. Tenderness: There is no abdominal tenderness. There is no guarding. Musculoskeletal:         General: Normal range of motion. Cervical back: Normal range of motion. Right lower leg: No edema. Left lower leg: No edema. Skin:     General: Skin is warm. Neurological:      General: No focal deficit present. Mental Status: She is alert and oriented to person, place, and time. Mental status is at baseline. Cranial Nerves: No cranial nerve deficit. Sensory: No sensory deficit. Motor: No weakness. Comments: Oriented to self, year, family and place   Psychiatric:         Mood and Affect: Mood normal.         Behavior: Behavior normal.         Thought Content:  Thought content normal.         Judgment: Judgment normal.          Additional Data:     Labs:  Results from last 7 days   Lab Units 12/03/23  0555 12/02/23  0827   WBC Thousand/uL 6.93 8.64   HEMOGLOBIN g/dL 12.5 15.4   HEMATOCRIT % 39.7 48.1*   PLATELETS Thousands/uL 252 350   NEUTROS PCT %  --  75   LYMPHS PCT %  --  15   MONOS PCT %  --  7   EOS PCT %  --  1     Results from last 7 days   Lab Units 12/03/23  0555 12/02/23  1758 12/02/23  0827   SODIUM mmol/L 141   < > 141   POTASSIUM mmol/L 3.6   < > 3.5   CHLORIDE mmol/L 113*   < > 108   CO2 mmol/L 22   < > 23   BUN mg/dL 12   < > 18   CREATININE mg/dL 0.82   < > 0.81   ANION GAP mmol/L 6   < > 10   CALCIUM mg/dL 7.7*   < > 9.5   ALBUMIN g/dL  --   --  3.8   TOTAL BILIRUBIN mg/dL  --   --  0.68   ALK PHOS U/L  --   --  59   ALT U/L  --   --  11   AST U/L  --   --  17   GLUCOSE RANDOM mg/dL 90   < > 108    < > = values in this interval not displayed. Results from last 7 days   Lab Units 12/02/23  0853   INR  0.97     Results from last 7 days   Lab Units 12/02/23  1407 12/02/23  0822   POC GLUCOSE mg/dl 93 96         Results from last 7 days   Lab Units 12/02/23  1758 12/02/23  1534 12/02/23  1417 12/02/23  1157   LACTIC ACID mmol/L 2.1* 4.2* 4.3* 3.0*   PROCALCITONIN ng/ml  --   --   --  <0.05       Lines/Drains:  Invasive Devices       Peripheral Intravenous Line  Duration             Peripheral IV Upper;Ventral (anterior); Right Arm -- days                          Imaging: Reviewed radiology reports from this admission including: chest xray, chest CT scan, abdominal/pelvic CT, and CT head    Recent Cultures (last 7 days):   Results from last 7 days   Lab Units 12/02/23  1157   BLOOD CULTURE  Received in Microbiology Lab. Culture in Progress. Received in Microbiology Lab. Culture in Progress.        Last 24 Hours Medication List:   Current Facility-Administered Medications   Medication Dose Route Frequency Provider Last Rate    acetaminophen  975 mg Oral Q6H Maisha Price PA-C      heparin (porcine)  5,000 Units Subcutaneous Q8H [address] Maisha Price PA-C      HYDROmorphone  0.2 mg Intravenous Q6H PRN Maisha Price PA-C      ketorolac  15 mg Intravenous Q6H [address] Maisha Price PA-C      lidocaine  1 patch Topical Daily Maisha Onslow, PA-C      magnesium sulfate  2 g Intravenous Once Maisha Onslow, PA-C 2 g (12/03/23 7955)    ondansetron  4 mg Intravenous Q6H PRN Maisha Red Willow, PA-JESSIKA      oxyCODONE  5 mg Oral Q6H PRN Maisha Red Willow, PA-C      sodium chloride  125 mL/hr Intravenous Continuous Maisha Red Willow, PA-C 125 mL/hr (12/02/23 3159)        Today, Patient Was Seen By: Nataliya Carolina PA-C    **Please Note: This note may have been constructed using a voice recognition system. **

## 2023-12-04 ENCOUNTER — APPOINTMENT (INPATIENT)
Dept: NEUROLOGY | Facility: HOSPITAL | Age: 83
DRG: 064 | End: 2023-12-04
Payer: MEDICARE

## 2023-12-04 ENCOUNTER — APPOINTMENT (INPATIENT)
Dept: NON INVASIVE DIAGNOSTICS | Facility: HOSPITAL | Age: 83
DRG: 064 | End: 2023-12-04
Payer: MEDICARE

## 2023-12-04 ENCOUNTER — APPOINTMENT (INPATIENT)
Dept: MRI IMAGING | Facility: HOSPITAL | Age: 83
DRG: 064 | End: 2023-12-04
Payer: MEDICARE

## 2023-12-04 PROBLEM — I63.9 CEREBROVASCULAR ACCIDENT (CVA) (HCC): Status: ACTIVE | Noted: 2023-12-04

## 2023-12-04 LAB
ANION GAP SERPL CALCULATED.3IONS-SCNC: 4 MMOL/L
BUN SERPL-MCNC: 17 MG/DL (ref 5–25)
CALCIUM SERPL-MCNC: 8.2 MG/DL (ref 8.4–10.2)
CHLORIDE SERPL-SCNC: 116 MMOL/L (ref 96–108)
CO2 SERPL-SCNC: 22 MMOL/L (ref 21–32)
CREAT SERPL-MCNC: 0.86 MG/DL (ref 0.6–1.3)
GFR SERPL CREATININE-BSD FRML MDRD: 62 ML/MIN/1.73SQ M
GLUCOSE SERPL-MCNC: 85 MG/DL (ref 65–140)
MAGNESIUM SERPL-MCNC: 2.1 MG/DL (ref 1.9–2.7)
POTASSIUM SERPL-SCNC: 4.2 MMOL/L (ref 3.5–5.3)
SODIUM SERPL-SCNC: 142 MMOL/L (ref 135–147)

## 2023-12-04 PROCEDURE — 80048 BASIC METABOLIC PNL TOTAL CA: CPT

## 2023-12-04 PROCEDURE — 97535 SELF CARE MNGMENT TRAINING: CPT

## 2023-12-04 PROCEDURE — 95816 EEG AWAKE AND DROWSY: CPT

## 2023-12-04 PROCEDURE — NC001 PR NO CHARGE: Performed by: STUDENT IN AN ORGANIZED HEALTH CARE EDUCATION/TRAINING PROGRAM

## 2023-12-04 PROCEDURE — 93306 TTE W/DOPPLER COMPLETE: CPT | Performed by: STUDENT IN AN ORGANIZED HEALTH CARE EDUCATION/TRAINING PROGRAM

## 2023-12-04 PROCEDURE — 97163 PT EVAL HIGH COMPLEX 45 MIN: CPT

## 2023-12-04 PROCEDURE — 93306 TTE W/DOPPLER COMPLETE: CPT

## 2023-12-04 PROCEDURE — 99232 SBSQ HOSP IP/OBS MODERATE 35: CPT

## 2023-12-04 PROCEDURE — 70551 MRI BRAIN STEM W/O DYE: CPT

## 2023-12-04 PROCEDURE — 92523 SPEECH SOUND LANG COMPREHEN: CPT

## 2023-12-04 PROCEDURE — 83735 ASSAY OF MAGNESIUM: CPT

## 2023-12-04 PROCEDURE — 72148 MRI LUMBAR SPINE W/O DYE: CPT

## 2023-12-04 RX ORDER — ATORVASTATIN CALCIUM 40 MG/1
40 TABLET, FILM COATED ORAL EVERY EVENING
Status: DISCONTINUED | OUTPATIENT
Start: 2023-12-04 | End: 2023-12-05 | Stop reason: HOSPADM

## 2023-12-04 RX ADMIN — ACETAMINOPHEN 975 MG: 325 TABLET, FILM COATED ORAL at 12:19

## 2023-12-04 RX ADMIN — LIDOCAINE 5% 1 PATCH: 700 PATCH TOPICAL at 09:41

## 2023-12-04 RX ADMIN — CYANOCOBALAMIN 1000 MCG: 1000 INJECTION, SOLUTION INTRAMUSCULAR; SUBCUTANEOUS at 09:41

## 2023-12-04 RX ADMIN — HEPARIN SODIUM 5000 UNITS: 5000 INJECTION INTRAVENOUS; SUBCUTANEOUS at 05:21

## 2023-12-04 RX ADMIN — ACETAMINOPHEN 975 MG: 325 TABLET, FILM COATED ORAL at 18:14

## 2023-12-04 RX ADMIN — ATORVASTATIN CALCIUM 40 MG: 40 TABLET, FILM COATED ORAL at 18:14

## 2023-12-04 RX ADMIN — ACETAMINOPHEN 975 MG: 325 TABLET, FILM COATED ORAL at 23:30

## 2023-12-04 RX ADMIN — ACETAMINOPHEN 975 MG: 325 TABLET, FILM COATED ORAL at 05:21

## 2023-12-04 RX ADMIN — HEPARIN SODIUM 5000 UNITS: 5000 INJECTION INTRAVENOUS; SUBCUTANEOUS at 21:50

## 2023-12-04 RX ADMIN — ASPIRIN 81 MG: 81 TABLET, COATED ORAL at 12:19

## 2023-12-04 RX ADMIN — HEPARIN SODIUM 5000 UNITS: 5000 INJECTION INTRAVENOUS; SUBCUTANEOUS at 15:19

## 2023-12-04 NOTE — SPEECH THERAPY NOTE
Speech Language/Pathology  Speech/Language Evaluation      Patient Name: Ten Phan        Summary   Pt presents w/ moderate cognitive impairments w/ reductions noted in short term memory, generative naming, problem solving, auditory comprehension, visual spatial skills and insight. Clock drawing was impaired in hour markers  and correct time. Pt did not note errors and stated "I wasn't trying to make a masterpiece. " SLUMS score 17/30. Pt was oriented x4. Speech and language appears grossly WFL. Pt endorses some intermittent STM deficits but lives [place], drives, manages meds/money. Pt denies changes since CVA. Reports she does not feel any different and wants to discharge today, continue driving, etc. Education provided on CVAs. Largest barrier appears to be insight, pt does not feel she need further medical care even when confronted w/ changes in health status and cognition. Recommendation:  Cognitive therapy at next level of care- HH vs OP      Current Medical:       Ten Phan is a 80 y.o. female with a PMH of chronic low back pain and ckd stage 3a who presents with back pain. Son at bedside to provide most of history. States patient woke up this morning was trying to ambulate to the bathroom when her back pain got severely worse. States she has chronic back pain with left lower extremity sciatica, however this morning got acutely worse when ambulating. No falls at home. Patient did ambulate to bathroom and then called neighbor to come help her. EMS brought patient to ED. Patient poor historian on exam.  Patient lives alone and is independent at baseline and uses cane to get around. Denies any recent trauma or sick contacts. Son states patient has chronic diarrhea and is sometimes incontinent of bowels, however this has gone on for years. MRI Brain:  Recent, acute to subacute, lacunar infarct in the right cerebellum, posterior inferior cerebellar artery territory.        General Cognitive Status:  Alert with adequate attention    Auditory Comprehension:  Grossly intact  Paragraph Level Comprehension: Impaired- 3/4    Speech Mechanism Examination:   Facial: symmetrical  Labial: WFL  Lingual: WFL  Velum: symmetrical  Mandible: adequate ROM  Dentition: adequate  Vocal quality:clear/adequate       Oral Expression:  Appears intact     Written Expression:  Did not fully assess- clock drawing impaired    Motor Speech:  Intact    Orientation:  Intact except for reason for hospitalization    Cognitive -linguistic skills:  Impaired  SLUMs score 17/30    Problem solving: Impaired  Immediate memory: intact  Delayed memory: impaired   Short term memory: impaired  Long term memory: intact    Also noted:  Not aware of deficits:    Results discussed with:  Patient and son    Raquel Jamison, [address] [facility] [place]-SLP  12/4/2023

## 2023-12-04 NOTE — CASE MANAGEMENT
Case Management Assessment & Discharge Planning Note    Patient name Cody Hamilton  Location /-68 MRN [id number]  : 1940 Date 2023       Current Admission Date: 2023  Current Admission Diagnosis:Acute metabolic encephalopathy   Patient Active Problem List    Diagnosis Date Noted    Cerebrovascular accident (CVA) ([address]) 2023    Transient alteration of awareness     Acute metabolic encephalopathy     Chronic low back pain 2023    Stage 3a chronic kidney disease (CKD) ([address]) 2023      LOS (days): 2  Geometric Mean LOS (GMLOS) (days): 4.60  Days to GMLOS:2.4     OBJECTIVE:    Risk of Unplanned Readmission Score: 6.61         Current admission status: Inpatient       Preferred Pharmacy: No Pharmacies Listed  Primary Care Provider: No primary care provider on file. Primary Insurance: MEDICARE  Secondary Insurance: AARP    ASSESSMENT:  Active Health Care Proxies    There are no active Health Care Proxies on file. Patient Information  Admitted from[de-identified] Home  Mental Status: Alert  During Assessment patient was accompanied by: Not accompanied during assessment  Assessment information provided by[de-identified] Patient  Primary Caregiver: Self  Support Systems: [address] Residence: [facility] [place] entry access options.  Select all that apply.: Stairs  Number of steps to enter home.: 6  Type of Current Residence: [address] [place] home  Upon entering residence, is there a bedroom on the main floor (no further steps)?: No  A bedroom is located on the following floor levels of residence (select all that apply):: 2nd Floor  Upon entering residence, is there a bathroom on the main floor (no further steps)?: No  Indicate which floors of current residence have a bathroom (select all the apply):: 2nd Floor  Number of steps to 2nd floor from main floor: One Flight  Living Arrangements: Lives Alone    Activities of Daily Living Prior to Admission  Functional Status: Independent  Completes ADLs independently?: Yes  Ambulates independently?: Yes  Does patient use assisted devices?: Yes  Assisted Devices (DME) used: Straight Cane  Does patient currently own DME?: Yes  What DME does the patient currently own?: Straight Cane  Does patient have a history of Outpatient Therapy (PT/OT)?: No  Does the patient have a history of Short-Term Rehab?: No  Does patient have a history of HHC?: No  Does patient currently have [facility]?: No         Patient Information Continued  Income Source: SSI/SSD  Does patient have prescription coverage?: Yes  Does patient receive dialysis treatments?: No  Does patient have a history of substance abuse?: No  Does patient have a history of Mental Health Diagnosis?: No                Housing Stability: Not on file   Food Insecurity: Not on file   Transportation Needs: Not on file   Utilities: Not on file       DISCHARGE DETAILS:       Freedom of Choice: Yes                   Contacts  Patient Contacts: daughter, Manuel Gilmore  Relationship to Patient[de-identified] Family                   Would you like to participate in our [address] service program?  : No - Declined              Pt from home, IPTA. Pt lives alone, has friends and family who are supportive and available to assist patient at time of discharge            Discussed pt during interdisciplinary rounds, CM, nursing, OT, speech and attending present. MRI showing-  Recent, acute to subacute, lacunar infarct in the right cerebellum, posterior inferior cerebellar artery territory. Therapy recommending Level III resource intensity at time of discharge. Pt is agreeable to [facility], pt does not have a preference to any [facility] agency. CM placing blanket HHC referrals in [place]    A post acute care recommendation was made by your care team for [facility]. Discussed [place] of Choice with both patient and caregiver.  List of agencies given to both patient and caregiver via in person. both patient and caregiver aware the list is custom filtered for them by zip code location and that [facility] post acute providers are designated.        [facility] has accepted pt at time of discharge

## 2023-12-04 NOTE — ASSESSMENT & PLAN NOTE
History of degenerative disc disease with worsening pain since morning of admission when getting out of bed  Lumbar CT showing no acute fracture or malalignment. Chronic degenerative changes   History of bowel incontinence and chronic diarrhea  MRI lumbar spine: Multilevel degenerative disc disease and facet osteoarthritis. Single small right-sided L2-L3 disc herniation. Predominantly mild stenoses. Details above. At L3-L4, degenerative change results in moderate asymmetric right foraminal narrowing with evidence of right L3 nerve root impingement. Correlate clinically for L3 radiculopathy symptoms. Scoliosis and multilevel degenerative spondylolisthesis. Mild, chronic L1 compression fracture. Pain management - heat, toradol, tylenol, and lidoderm patch  PT/OT consulted: III (Minimal intensity)  Neurology recommending: outpatient follow up with spine specialist to see if PT conservative management can help, if any progressive weakness/sensory cahnges then OP EMG.

## 2023-12-04 NOTE — PLAN OF CARE
Problem: PHYSICAL THERAPY ADULT  Goal: Performs mobility at highest level of function for planned discharge setting. See evaluation for individualized goals. Description: Treatment/Interventions: ADL retraining, Functional transfer training, LE strengthening/ROM, Elevations, Therapeutic exercise, Endurance training, Patient/family training, Equipment eval/education, Bed mobility, Gait training, Cognitive reorientation, Compensatory technique education, Spoke to nursing, Spoke to case management, OT  Equipment Recommended:  (cane-pt has. pt also has walker to use prn should she have pain as son reports typically has arthritis pain)       See flowsheet documentation for full assessment, interventions and recommendations. [id number] by Ozzie Gillespie PT  Note: Prognosis: Good  Problem List: Decreased strength, Decreased endurance, Impaired balance, Decreased mobility, Decreased cognition, Impaired judgement, Decreased safety awareness, Decreased coordination, Obesity  Pt tolerated session fairly well. She requires increased time to complete mobility and cues for improved gait pattern and safety with ambulation. She is limited by decreased strength, balance and endurance. She will cotninue to benefit from PT serices to maximize LOF. Barriers to Discharge: Decreased caregiver support  Barriers to Discharge Comments: lives alone. would beenfit from increased support  Rehab Resource Intensity Level, PT: III (Minimum Resource Intensity)    See flowsheet documentation for full assessment.

## 2023-12-04 NOTE — QUICK NOTE
Neurology follow up requested after imaging completed earlier today. MRI brain showing small right cerebellar acute/ subacute ischemic infarct. CTA H/N unrevealing in regards to source for this. Recommend stroke protocol. telemetry/ ECHOcardiogram/stroke protocol q4h checks okay and goal normotension as > 48 hours of symptoms  PT/OT/ SLP recs appreciated  ASA 81 and atorvastatin for secondary stroke prevention    If patient not encephelopathic any longer then EEG can be done OP. I am told patient's lactic acidosis on admit is due to dehydration which has since been addressed by primary team  Low B12- discussed 1000 mcg IM injections at least in hospital then can switch to IM injections weekly or monthly and PO supplementation per patient preference- should follow up with PCP     MRI L spine with significant DDD with focal small disc herniation L2-3 and more pronounced changes at L3-L4. Appreciate PT/OT recs in this regard, also recommend patient follow up with spine specialist OP to see if physical therapy conservative management can help. If any progressive RLE weakness, sensory changes recommend OP EMG of the RLE.     Pt should follow up with OP general or vascular neurology attending physician within 6-8 weeks time    D/w primary team AP above at length

## 2023-12-04 NOTE — PLAN OF CARE

## 2023-12-04 NOTE — ASSESSMENT & PLAN NOTE
Presents to ED with slowed speech and poor mentation. CT scan: No acute intracranial CT abnormality. CTA head/neck   No intracranial large vessel occlusion. 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 No hemodynamically significant stenosis or dissection of cervical carotid and vertebral arteries. Severe multinodular thyromegaly. Correlate with prior thyroid work-up/ultrasound. 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    ECHO ordered  Stroke pathway  DVT prophylaxis   PT/OT: III (Minimum Resource Intensity)   Passed bedside swallow eval  SLP: recommending cognitive therapy at next level of care- HH vs OP   ASA 81 mg  Lipitor 40 mg qd  Lipid panel/TSH/Hgb A1C ordered   Telemetry, neuro checks   Neurology consult: needs echo, tele, stroke protocol, BP goal normotension due to sx starting on 12/2, q4h neuro checks, asa 81, lipitor 40 for secondary stroke prevention, b12 injections  OP follow up in 6-8 weeks with neurology

## 2023-12-04 NOTE — PROGRESS NOTES
[address] [facility]  Progress Note  Name: Douglas Serrano  MRN: [id number]  Unit/Bed#: -01 I Date of Admission: 12/2/2023   Date of Service: 12/4/2023 I Hospital Day: 2    Assessment/Plan   * Acute metabolic encephalopathy  Assessment & Plan  POA with slow speech and poor mentation per neighbor/friend. Also with worsening back pain since morning of admission. Son states patient has history of AMS when "traumatic" events occur, and attributes current mentation to severe back pain  Neuro consulted and recommending head ct and CTA. Recommending MRI head and EEG due to AMS and still not at baseline  TSH, UDS, ethanol level, crp, esr normal  UA and procal negative. Afebrile and without leukocytosis  Lactic mildly elevated, given 1L bolus and started on IVF continuous   Flu/covid/rsv negative  C diff and stool samples pending 2/2 diarrhea  Discontinued as patient without any further diarrhea  Psych consulted due to very anxious and tearful exam with poor mentation  CT AP and CXR without infectious process  Infectious workup negative thus far  B12 slightly low, will do 5 days IM injection follows by oral and repeat labs OP in 1 month  Neurology: b12 low, should be around 400 - recommend 1000 mcg injections x 5 days and then po tabs or weekly/monthly b12 injections outpatient with PCP. 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 EEG: Left temporal irregular delta activity. This abnormal study is consistent with left temporal non-specific cerebral dysfunction. No electrographic seizures or interictal epileptiform discharges (seizure tendency) were seen. No diagnostic clinical events were captured. Neuro recommending stroke protocol  Continue to reorient as needed. Delirium precautions    Cerebrovascular accident (CVA) [facility])  Assessment & Plan  Presents to ED with slowed speech and poor mentation.    CT scan: No acute intracranial CT abnormality. CTA head/neck   No intracranial large vessel occlusion. 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 No hemodynamically significant stenosis or dissection of cervical carotid and vertebral arteries. Severe multinodular thyromegaly. Correlate with prior thyroid work-up/ultrasound. 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 ECHO ordered  Stroke pathway  DVT prophylaxis   PT/OT: III (Minimum Resource Intensity)   Passed bedside swallow eval  SLP: recommending cognitive therapy at next level of care- HH vs OP   ASA 81 mg  Lipitor 40 mg qd  Lipid panel/TSH/Hgb A1C ordered   Telemetry, neuro checks   Neurology consult: needs echo, tele, stroke protocol, BP goal normotension due to sx starting on 12/2, q4h neuro checks, asa 81, lipitor 40 for secondary stroke prevention, b12 injections  OP follow up in 6-8 weeks with neurology    Chronic low back pain  Assessment & Plan  History of degenerative disc disease with worsening pain since morning of admission when getting out of bed  Lumbar CT showing no acute fracture or malalignment. Chronic degenerative changes   History of bowel incontinence and chronic diarrhea  MRI lumbar spine: Multilevel degenerative disc disease and facet osteoarthritis. Single small right-sided L2-L3 disc herniation. Predominantly mild stenoses. Details above. At L3-L4, degenerative change results in moderate asymmetric right foraminal narrowing with evidence of right L3 nerve root impingement. Correlate clinically for L3 radiculopathy symptoms. Scoliosis and multilevel degenerative spondylolisthesis. Mild, chronic L1 compression fracture.   Pain management - heat, toradol, tylenol, and lidoderm patch  PT/OT consulted: III (Minimal intensity)  Neurology recommending: outpatient follow up with spine specialist to see if PT conservative management can help, if any progressive weakness/sensory cahnges then OP EMG. Stage 3a chronic kidney disease (CKD) [facility])  Assessment & Plan  Lab Results   Component Value Date    EGFR 62 12/04/2023    EGFR 66 12/03/2023    EGFR 76 12/02/2023    CREATININE 0.86 12/04/2023    CREATININE 0.82 12/03/2023    CREATININE 0.73 12/02/2023   Currently at baseline   Avoid nephrotoxic agent and hypotension           VTE Pharmacologic Prophylaxis:   Moderate Risk (Score 3-4) - Pharmacological DVT Prophylaxis Ordered: heparin. Mobility:   Basic Mobility Inpatient Raw Score: 19  JH-HLM Goal: 6: Walk 10 steps or more  JH-HLM Achieved: 8: Walk 250 feet ot more  HLM Goal achieved. Continue to encourage appropriate mobility. Patient Centered Rounds: I performed bedside rounds with nursing staff today. Discussions with Specialists or Other Care Team Provider: nursing, case management, neurology    Education and Discussions with Family / Patient: Updated  (son) at bedside. Total Time Spent on Date of Encounter in care of patient: 55 mins. This time was spent on one or more of the following: performing physical exam; counseling and coordination of care; obtaining or reviewing history; documenting in the medical record; reviewing/ordering tests, medications or procedures; communicating with other healthcare professionals and discussing with patient's family/caregivers. Current Length of Stay: 2 day(s)  Current Patient Status: Inpatient   Certification Statement: The patient will continue to require additional inpatient hospital stay due to stroke protocol, echo  Discharge Plan: Anticipate discharge in 24-48 hrs to home with home services. Code Status: Level 3 - DNAR and DNI    Subjective:   Patient seen and examined this morning. She states that she feels better today and is more with it. She is unsure what happened on Saturday when her symptoms started. Still feeling foggy at times and forgetful.  She would like to go home today, but understands that she has to stay. Denies nausea, vomiting, diarrhea, constipation, abdominal pain, CP, SOB, fever, chills. Objective:     Vitals:   Temp (24hrs), Av.6 °F (36.4 °C), Min:97.5 °F (36.4 °C), Max:97.7 °F (36.5 °C)    Temp:  [97.5 °F (36.4 °C)-97.7 °F (36.5 °C)] 97.7 °F (36.5 °C)  HR:  [71-80] 80  Resp:  [17-20] 20  BP: (129-153)/(59-74) 129/59  SpO2:  [96 %] 96 %  Body mass index is 31.31 kg/m². Input and Output Summary (last 24 hours): Intake/Output Summary (Last 24 hours) at 2023 1720  Last data filed at 12/3/2023 1801  Gross per 24 hour   Intake 180 ml   Output 200 ml   Net -20 ml       Physical Exam:   Physical Exam  Vitals reviewed. Constitutional:       General: She is not in acute distress. Appearance: She is obese. She is not ill-appearing or toxic-appearing. HENT:      Head: Normocephalic and atraumatic. Mouth/Throat:      Mouth: Mucous membranes are moist.   Eyes:      Pupils: Pupils are equal, round, and reactive to light. Cardiovascular:      Rate and Rhythm: Normal rate and regular rhythm. Heart sounds: No murmur heard. Pulmonary:      Effort: No respiratory distress. Breath sounds: No stridor. No wheezing. Abdominal:      General: Bowel sounds are normal. There is no distension. Palpations: Abdomen is soft. There is no mass. Tenderness: There is no abdominal tenderness. Musculoskeletal:      Right lower leg: No edema. Left lower leg: No edema. Skin:     General: Skin is warm and dry. Neurological:      Mental Status: She is alert and oriented to person, place, and time. Cranial Nerves: No cranial nerve deficit. Sensory: No sensory deficit. Motor: No weakness. Comments: Answers orientation questions appropriately. Oriented to self, place, year, month. Follows directions appropriately, but with delayed impairment in short term recall.  With long term recall appropriate   Psychiatric: Behavior: Behavior normal.          Additional Data:     Labs:  Results from last 7 days   Lab Units 12/03/23  0555 12/02/23  0827   WBC Thousand/uL 6.93 8.64   HEMOGLOBIN g/dL 12.5 15.4   HEMATOCRIT % 39.7 48.1*   PLATELETS Thousands/uL 252 350   NEUTROS PCT %  --  75   LYMPHS PCT %  --  15   MONOS PCT %  --  7   EOS PCT %  --  1     Results from last 7 days   Lab Units 12/04/23  0520 12/02/23  1758 12/02/23  0827   SODIUM mmol/L 142   < > 141   POTASSIUM mmol/L 4.2   < > 3.5   CHLORIDE mmol/L 116*   < > 108   CO2 mmol/L 22   < > 23   BUN mg/dL 17   < > 18   CREATININE mg/dL 0.86   < > 0.81   ANION GAP mmol/L 4   < > 10   CALCIUM mg/dL 8.2*   < > 9.5   ALBUMIN g/dL  --   --  3.8   TOTAL BILIRUBIN mg/dL  --   --  0.68   ALK PHOS U/L  --   --  59   ALT U/L  --   --  11   AST U/L  --   --  17   GLUCOSE RANDOM mg/dL 85   < > 108    < > = values in this interval not displayed. Results from last 7 days   Lab Units 12/02/23  0853   INR  0.97     Results from last 7 days   Lab Units 12/02/23  1407 12/02/23  0822   POC GLUCOSE mg/dl 93 96         Results from last 7 days   Lab Units 12/02/23  1758 12/02/23  1534 12/02/23  1417 12/02/23  1157   LACTIC ACID mmol/L 2.1* 4.2* 4.3* 3.0*   PROCALCITONIN ng/ml  --   --   --  <0.05       Lines/Drains:  Invasive Devices       Peripheral Intravenous Line  Duration             Peripheral IV Upper;Ventral (anterior); Right Arm -- days                      Telemetry:  Telemetry Orders (From admission, onward)               24 Hour Telemetry Monitoring  Continuous x 24 Hours (Telem)        Question:  Reason for 24 Hour Telemetry  Answer:  TIA/Suspected CVA/ Confirmed CVA                     Imaging: Reviewed radiology reports from this admission including: MRI lumbar spine, MRI brain, XR chest, CT AP, CTA head/neck, CT lumbar spine, CT brain, EEG    Recent Cultures (last 7 days):   Results from last 7 days   Lab Units 12/02/23  1157   BLOOD CULTURE  Staphylococcus epidermidis* No Growth at 24 hrs. GRAM STAIN RESULT  Gram positive cocci in clusters*       Last 24 Hours Medication List:   Current Facility-Administered Medications   Medication Dose Route Frequency Provider Last Rate    acetaminophen  975 mg Oral Q6H Maisha Price PA-C      aspirin  81 mg Oral Daily Milan Woodall PA-C      atorvastatin  40 mg Oral QPM Omaira Bolaños PA-C      cyanocobalamin  1,000 mcg Subcutaneous Daily Maisha Price PA-C      heparin (porcine)  5,000 Units Subcutaneous Q8H [address] Maisha Price PA-C      HYDROmorphone  0.2 mg Intravenous Q6H PRN Maisha Price PA-C      lidocaine  1 patch Topical Daily Maisha Price PA-C      ondansetron  4 mg Intravenous Q6H PRN Maisha Price, GRACIELA      oxyCODONE  5 mg Oral Q6H PRN Brynn Lara PA-C          Today, Patient Was Seen By: Milan Woodall PA-C    **Please Note: This note may have been constructed using a voice recognition system. **

## 2023-12-04 NOTE — PLAN OF CARE
Problem: PHYSICAL THERAPY ADULT  Goal: Performs mobility at highest level of function for planned discharge setting. See evaluation for individualized goals. Description: Treatment/Interventions: ADL retraining, Functional transfer training, LE strengthening/ROM, Elevations, Therapeutic exercise, Endurance training, Patient/family training, Equipment eval/education, Bed mobility, Gait training, Cognitive reorientation, Compensatory technique education, Spoke to nursing, Spoke to case management, OT  Equipment Recommended:  (cane-pt has. pt also has walker to use prn should she have pain as son reports typically has arthritis pain)       See flowsheet documentation for full assessment, interventions and recommendations. Note: Prognosis: Good  Problem List: Decreased strength, Decreased endurance, Impaired balance, Decreased mobility, Decreased cognition, Impaired judgement, Decreased safety awareness, Decreased coordination, Obesity  Assessment: Pt is a 80 y.o. female seen for PT evaluation s/p admission to [address] [place] [address] on 12/2/2023 with Acute metabolic encephalopathy. Order placed for PT services. Upon evaluation: Pt is presenting with impaired functional mobility due to decreased strength, decreased endurance, impaired balance, impaired coordination, gait deviations, impaired cognition, decreased safety awareness, impaired judgment, fall risk, and LE edema requiring  supervision assistance for bed mobility, supervision to stand by assistance for transfers, and stand by assistance for ambulation with spc .  Pt's clinical presentation is currently unpredictable given the functional mobility deficits above, especially weakness, edema of extremities, decreased endurance, gait deviations, decreased activity tolerance, decreased functional mobility tolerance, decreased safety awareness, impaired judgement, and decreased cognition, coupled with fall risks as indicated by -PAC 6-Clicks: 28/86 and score of 6/12 on 4 item DGI with spc as well as hx of falls, impaired balance, polypharmacy, impaired judgement, decreased safety awareness, decreased cognition, and obesity and combined with medical complications of need for input for mobility technique/safety and abnormal lactic acid, Recent, acute to subacute, lacunar infarct in the right cerebellum, posterior inferior cerebellar artery territory on brain MRI, Multilevel degenerative disc disease and facet osteoarthritis. Single small right-sided L2-L3 disc herniation. Predominantly mild stenoses, At L3-L4, degenerative change results in moderate asymmetric right foraminal narrowing with evidence of right L3 nerve root impingement. Correlate clinically for L3 radiculopathy symptoms, acute metabolic encephalopathy, hypertensive urgency on admission . Pt's PMHx and comorbidities that may affect physical performance and progress include: CKD, obesity, and chronic low back pain, chronic L1 compression fracture, anxiety, arthritis, depression . Personal factors affecting pt at time of IE include: inaccessible home environment, step(s) to enter environment, multi-level environment, limited home support, advanced age, inability to perform IADLs, inability to perform ADLs, inability to navigate level surfaces without external assistance, inability to ambulate household distances, and limited insight into impairments. Pt will benefit from continued skilled PT services to address deficits as defined above and to maximize level of functional mobi  Barriers to Discharge: Decreased caregiver support  Barriers to Discharge Comments: lives alone. would beenfit from increased support  Rehab Resource Intensity Level, PT: III (Minimum Resource Intensity)    See flowsheet documentation for full assessment.

## 2023-12-04 NOTE — PROGRESS NOTES
Patient:    MRN:  [id number]    Mark Request ID:  [id number]    Level of care reserved:  [address]    Partner Reserved:  KALLI CARLTON [facility]), [facility], [address] [facility] [phone]    Clinical needs requested:    Geography searched:  [facility]    Start of Service:    Request sent:  4:22pm EST on 12/4/2023 by Hossein Butcher    Partner reserved:  4:38pm EST on 12/4/2023 by Hossein Butcher    Choice list shared:

## 2023-12-04 NOTE — ASSESSMENT & PLAN NOTE
Lab Results   Component Value Date    EGFR 62 12/04/2023    EGFR 66 12/03/2023    EGFR 76 12/02/2023    CREATININE 0.86 12/04/2023    CREATININE 0.82 12/03/2023    CREATININE 0.73 12/02/2023   Currently at baseline   Avoid nephrotoxic agent and hypotension

## 2023-12-04 NOTE — PLAN OF CARE
Problem: Potential for Falls  Goal: Patient will remain free of falls  Description: INTERVENTIONS:  - Educate patient/family on patient safety including physical limitations  - Instruct patient to call for assistance with activity   - Consult OT/PT to assist with strengthening/mobility   - Keep Call bell within reach  - Keep bed low and locked with side rails adjusted as appropriate  - Keep care items and personal belongings within reach  - Initiate and maintain comfort rounds  - Make Fall Risk Sign visible to staff  - Offer Toileting every 2 Hours, in advance of need  - Initiate/Maintain bed alarm  - Obtain necessary fall risk management equipment  - Apply yellow socks and bracelet for high fall risk patients  - Consider moving patient to room near nurses station  Outcome: Progressing     Problem: PAIN - ADULT  Goal: Verbalizes/displays adequate comfort level or baseline comfort level  Description: Interventions:  - Encourage patient to monitor pain and request assistance  - Assess pain using appropriate pain scale  - Administer analgesics based on type and severity of pain and evaluate response  - Implement non-pharmacological measures as appropriate and evaluate response  - Consider cultural and social influences on pain and pain management  - Notify physician/advanced practitioner if interventions unsuccessful or patient reports new pain  Outcome: Progressing     Problem: INFECTION - ADULT  Goal: Absence or prevention of progression during hospitalization  Description: INTERVENTIONS:  - Assess and monitor for signs and symptoms of infection  - Monitor lab/diagnostic results  - Monitor all insertion sites, i.e. indwelling lines, tubes, and drains  - Monitor endotracheal if appropriate and nasal secretions for changes in amount and color  - [place] appropriate cooling/warming therapies per order  - Administer medications as ordered  - Instruct and encourage patient and family to use good hand hygiene technique  - Identify and instruct in appropriate isolation precautions for identified infection/condition  Outcome: Progressing  Goal: Absence of fever/infection during neutropenic period  Description: INTERVENTIONS:  - Monitor WBC    Outcome: Progressing     Problem: SAFETY ADULT  Goal: Patient will remain free of falls  Description: INTERVENTIONS:  - Educate patient/family on patient safety including physical limitations  - Instruct patient to call for assistance with activity   - Consult OT/PT to assist with strengthening/mobility   - Keep Call bell within reach  - Keep bed low and locked with side rails adjusted as appropriate  - Keep care items and personal belongings within reach  - Initiate and maintain comfort rounds  - Make Fall Risk Sign visible to staff  - Offer Toileting every 2 Hours, in advance of need  - Initiate/Maintain bed alarm  - Obtain necessary fall risk management equipment  - Apply yellow socks and bracelet for high fall risk patients  - Consider moving patient to room near nurses station  Outcome: Progressing  Goal: Maintain or return to baseline ADL function  Description: INTERVENTIONS:  -  Assess patient's ability to carry out ADLs; assess patient's baseline for ADL function and identify physical deficits which impact ability to perform ADLs (bathing, care of mouth/teeth, toileting, grooming, dressing, etc.)  - Assess/evaluate cause of self-care deficits   - Assess range of motion  - Assess patient's mobility; develop plan if impaired  - Assess patient's need for assistive devices and provide as appropriate  - Encourage maximum independence but intervene and supervise when necessary  - Involve family in performance of ADLs  - Assess for home care needs following discharge   - Consider OT consult to assist with ADL evaluation and planning for discharge  - Provide patient education as appropriate  Outcome: Progressing  Goal: Maintains/Returns to pre admission functional level  Description: INTERVENTIONS:  - Perform AM-PAC 6 Click Basic Mobility/ Daily Activity assessment daily.  - Set and communicate daily mobility goal to care team and patient/family/caregiver. - Collaborate with rehabilitation services on mobility goals if consulted  - Record patient progress and toleration of activity level   Outcome: Progressing     Problem: DISCHARGE PLANNING  Goal: Discharge to home or other facility with appropriate resources  Description: INTERVENTIONS:  - Identify barriers to discharge w/patient and caregiver  - Arrange for needed discharge resources and transportation as appropriate  - Identify discharge learning needs (meds, wound care, etc.)  - Arrange for interpretive services to assist at discharge as needed  - Refer to Case Management Department for coordinating discharge planning if the patient needs post-hospital services based on physician/advanced practitioner order or complex needs related to functional status, cognitive ability, or social support system  Outcome: Progressing     Problem: Knowledge Deficit  Goal: Patient/family/caregiver demonstrates understanding of disease process, treatment plan, medications, and discharge instructions  Description: Complete learning assessment and assess knowledge base.   Interventions:  - Provide teaching at level of understanding  - Provide teaching via preferred learning methods  Outcome: Progressing     Problem: Prexisting or High Potential for Compromised Skin Integrity  Goal: Skin integrity is maintained or improved  Description: INTERVENTIONS:  - Identify patients at risk for skin breakdown  - Assess and monitor skin integrity  - Assess and monitor nutrition and hydration status  - Monitor labs   - Assess for incontinence   - Turn and reposition patient  - Assist with mobility/ambulation  - Relieve pressure over bony prominences  - Avoid friction and shearing  - Provide appropriate hygiene as needed including keeping skin clean and dry  - Evaluate need for skin moisturizer/barrier cream  - Collaborate with interdisciplinary team   - Patient/family teaching  - Consider wound care consult   Outcome: Progressing

## 2023-12-05 VITALS
OXYGEN SATURATION: 98 % | HEIGHT: 66 IN | TEMPERATURE: 97.5 F | HEART RATE: 69 BPM | BODY MASS INDEX: 31.18 KG/M2 | RESPIRATION RATE: 17 BRPM | DIASTOLIC BLOOD PRESSURE: 70 MMHG | SYSTOLIC BLOOD PRESSURE: 156 MMHG | WEIGHT: 194 LBS

## 2023-12-05 LAB
ALBUMIN SERPL BCP-MCNC: 3.3 G/DL (ref 3.5–5)
ALP SERPL-CCNC: 45 U/L (ref 34–104)
ALT SERPL W P-5'-P-CCNC: 13 U/L (ref 7–52)
ANION GAP SERPL CALCULATED.3IONS-SCNC: 5 MMOL/L
AORTIC ROOT: 2.7 CM
APICAL FOUR CHAMBER EJECTION FRACTION: 56 %
AST SERPL W P-5'-P-CCNC: 32 U/L (ref 13–39)
AV PEAK GRADIENT: 8 MMHG
BILIRUB SERPL-MCNC: 0.48 MG/DL (ref 0.2–1)
BUN SERPL-MCNC: 14 MG/DL (ref 5–25)
CALCIUM ALBUM COR SERPL-MCNC: 9.1 MG/DL (ref 8.3–10.1)
CALCIUM SERPL-MCNC: 8.5 MG/DL (ref 8.4–10.2)
CHLORIDE SERPL-SCNC: 112 MMOL/L (ref 96–108)
CHOLEST SERPL-MCNC: 99 MG/DL
CO2 SERPL-SCNC: 25 MMOL/L (ref 21–32)
CREAT SERPL-MCNC: 0.79 MG/DL (ref 0.6–1.3)
DME PARACHUTE DELIVERY DATE REQUESTED: NORMAL
DME PARACHUTE ITEM DESCRIPTION: NORMAL
DME PARACHUTE ORDER STATUS: NORMAL
DME PARACHUTE SUPPLIER NAME: NORMAL
DME PARACHUTE SUPPLIER PHONE: NORMAL
E WAVE DECELERATION TIME: 205 MS
E/A RATIO: 0.83
ERYTHROCYTE [DISTWIDTH] IN BLOOD BY AUTOMATED COUNT: 13.3 % (ref 11.6–15.1)
EST. AVERAGE GLUCOSE BLD GHB EST-MCNC: 128 MG/DL
FOLATE SERPL-MCNC: 9.5 NG/ML
FRACTIONAL SHORTENING: 33 (ref 28–44)
GFR SERPL CREATININE-BSD FRML MDRD: 69 ML/MIN/1.73SQ M
GLUCOSE SERPL-MCNC: 87 MG/DL (ref 65–140)
HBA1C MFR BLD: 6.1 %
HCT VFR BLD AUTO: 40.6 % (ref 34.8–46.1)
HDLC SERPL-MCNC: 36 MG/DL
HGB BLD-MCNC: 12.7 G/DL (ref 11.5–15.4)
INTERVENTRICULAR SEPTUM IN DIASTOLE (PARASTERNAL SHORT AXIS VIEW): 1.2 CM
INTERVENTRICULAR SEPTUM: 1.2 CM (ref 0.6–1.1)
LAAS-AP2: 15.7 CM2
LAAS-AP4: 17.6 CM2
LACTATE SERPL-SCNC: 0.8 MMOL/L (ref 0.5–2)
LDLC SERPL CALC-MCNC: 39 MG/DL (ref 0–100)
LEFT ATRIUM SIZE: 3 CM
LEFT ATRIUM VOLUME (MOD BIPLANE): 40 ML
LEFT ATRIUM VOLUME INDEX (MOD BIPLANE): 20.3 ML/M2
LEFT INTERNAL DIMENSION IN SYSTOLE: 2.9 CM (ref 2.1–4)
LEFT VENTRICULAR INTERNAL DIMENSION IN DIASTOLE: 4.3 CM (ref 3.5–6)
LEFT VENTRICULAR POSTERIOR WALL IN END DIASTOLE: 1 CM
LEFT VENTRICULAR STROKE VOLUME: 50 ML
LVSV (TEICH): 50 ML
MAGNESIUM SERPL-MCNC: 1.8 MG/DL (ref 1.9–2.7)
MCH RBC QN AUTO: 30.1 PG (ref 26.8–34.3)
MCHC RBC AUTO-ENTMCNC: 31.3 G/DL (ref 31.4–37.4)
MCV RBC AUTO: 96 FL (ref 82–98)
MV E'TISSUE VEL-SEP: 10 CM/S
MV PEAK A VEL: 1.21 M/S
MV PEAK E VEL: 100 CM/S
MV STENOSIS PRESSURE HALF TIME: 59 MS
MV VALVE AREA P 1/2 METHOD: 3.73
PLATELET # BLD AUTO: 254 THOUSANDS/UL (ref 149–390)
PMV BLD AUTO: 8.3 FL (ref 8.9–12.7)
POTASSIUM SERPL-SCNC: 3.5 MMOL/L (ref 3.5–5.3)
PROT SERPL-MCNC: 5.9 G/DL (ref 6.4–8.4)
RA PRESSURE ESTIMATED: 3 MMHG
RBC # BLD AUTO: 4.22 MILLION/UL (ref 3.81–5.12)
RIGHT ATRIUM AREA SYSTOLE A4C: 11.4 CM2
RIGHT VENTRICLE ID DIMENSION: 2.4 CM
RV PSP: 31 MMHG
SL CV LEFT ATRIUM LENGTH A2C: 5.3 CM
SL CV LV EF: 65
SL CV PED ECHO LEFT VENTRICLE DIASTOLIC VOLUME (MOD BIPLANE) 2D: 83 ML
SL CV PED ECHO LEFT VENTRICLE SYSTOLIC VOLUME (MOD BIPLANE) 2D: 33 ML
SODIUM SERPL-SCNC: 142 MMOL/L (ref 135–147)
TR MAX PG: 28 MMHG
TR PEAK VELOCITY: 2.7 M/S
TRICUSPID ANNULAR PLANE SYSTOLIC EXCURSION: 1.9 CM
TRICUSPID VALVE PEAK REGURGITATION VELOCITY: 2.65 M/S
TRIGL SERPL-MCNC: 118 MG/DL
WBC # BLD AUTO: 6.86 THOUSAND/UL (ref 4.31–10.16)

## 2023-12-05 PROCEDURE — 83735 ASSAY OF MAGNESIUM: CPT

## 2023-12-05 PROCEDURE — G0408 INPT/TELE FOLLOW UP 35: HCPCS | Performed by: PSYCHIATRY & NEUROLOGY

## 2023-12-05 PROCEDURE — 99239 HOSP IP/OBS DSCHRG MGMT >30: CPT

## 2023-12-05 PROCEDURE — 80053 COMPREHEN METABOLIC PANEL: CPT

## 2023-12-05 PROCEDURE — 80061 LIPID PANEL: CPT

## 2023-12-05 PROCEDURE — 82746 ASSAY OF FOLIC ACID SERUM: CPT

## 2023-12-05 PROCEDURE — 83036 HEMOGLOBIN GLYCOSYLATED A1C: CPT

## 2023-12-05 PROCEDURE — 83605 ASSAY OF LACTIC ACID: CPT

## 2023-12-05 PROCEDURE — 85027 COMPLETE CBC AUTOMATED: CPT

## 2023-12-05 RX ORDER — CYANOCOBALAMIN (VITAMIN B-12) 500 MCG
500 TABLET ORAL DAILY
Qty: 30 TABLET | Refills: 0 | Status: SHIPPED | OUTPATIENT
Start: 2023-12-05

## 2023-12-05 RX ORDER — ATORVASTATIN CALCIUM 40 MG/1
40 TABLET, FILM COATED ORAL EVERY EVENING
Qty: 30 TABLET | Refills: 0 | Status: SHIPPED | OUTPATIENT
Start: 2023-12-05

## 2023-12-05 RX ORDER — SERTRALINE HYDROCHLORIDE 25 MG/1
25 TABLET, FILM COATED ORAL DAILY
Qty: 30 TABLET | Refills: 0 | Status: SHIPPED | OUTPATIENT
Start: 2023-12-05

## 2023-12-05 RX ORDER — CYANOCOBALAMIN (VITAMIN B-12) 250 MCG
250 TABLET ORAL DAILY
Qty: 30 TABLET | Refills: 0 | Status: CANCELLED | OUTPATIENT
Start: 2023-12-09

## 2023-12-05 RX ORDER — MAGNESIUM SULFATE HEPTAHYDRATE 40 MG/ML
2 INJECTION, SOLUTION INTRAVENOUS ONCE
Status: COMPLETED | OUTPATIENT
Start: 2023-12-05 | End: 2023-12-05

## 2023-12-05 RX ORDER — CYANOCOBALAMIN 1000 UG/ML
1000 INJECTION, SOLUTION INTRAMUSCULAR; SUBCUTANEOUS DAILY
Qty: 3 ML | Refills: 0 | Status: CANCELLED | OUTPATIENT
Start: 2023-12-06 | End: 2023-12-09

## 2023-12-05 RX ORDER — ATORVASTATIN CALCIUM 40 MG/1
40 TABLET, FILM COATED ORAL EVERY EVENING
Qty: 30 TABLET | Refills: 0 | Status: CANCELLED | OUTPATIENT
Start: 2023-12-05

## 2023-12-05 RX ADMIN — ASPIRIN 81 MG: 81 TABLET, COATED ORAL at 08:08

## 2023-12-05 RX ADMIN — ACETAMINOPHEN 975 MG: 325 TABLET, FILM COATED ORAL at 13:00

## 2023-12-05 RX ADMIN — LIDOCAINE 5% 1 PATCH: 700 PATCH TOPICAL at 08:09

## 2023-12-05 RX ADMIN — CYANOCOBALAMIN 1000 MCG: 1000 INJECTION, SOLUTION INTRAMUSCULAR; SUBCUTANEOUS at 08:09

## 2023-12-05 RX ADMIN — HEPARIN SODIUM 5000 UNITS: 5000 INJECTION INTRAVENOUS; SUBCUTANEOUS at 05:41

## 2023-12-05 RX ADMIN — MAGNESIUM SULFATE HEPTAHYDRATE 2 G: 40 INJECTION, SOLUTION INTRAVENOUS at 08:10

## 2023-12-05 RX ADMIN — ACETAMINOPHEN 975 MG: 325 TABLET, FILM COATED ORAL at 05:45

## 2023-12-05 NOTE — CASE MANAGEMENT
Case Management Discharge Planning Note    Patient name Mitch Garcia  Location /-31 MRN [id number]  : 1940 Date 2023       Current Admission Date: 2023  Current Admission Diagnosis:Acute metabolic encephalopathy   Patient Active Problem List    Diagnosis Date Noted    Cerebrovascular accident (CVA) ([address]) 2023    Transient alteration of awareness     Acute metabolic encephalopathy     Chronic low back pain 2023    Stage 3a chronic kidney disease (CKD) ([address]) 2023      LOS (days): 3  Geometric Mean LOS (GMLOS) (days): 4.60  Days to GMLOS:1.5     OBJECTIVE:  Risk of Unplanned Readmission Score: 8.25         Current admission status: Inpatient   Preferred Pharmacy:   [address] [place] [address] Road  [address] [place] [address]  Phone: [phone] Fax: [phone]    Primary Care Provider: Roddy Peralta DO    Primary Insurance: MEDICARE  Secondary Insurance: [facility]    DISCHARGE DETAILS:  CM delivered bedside commode to pt. Cm provided education on sizing. Pt signed paperwork and received a copy of the paperwork.

## 2023-12-05 NOTE — PROGRESS NOTES
Progress Note - Neurology   Harshad Espinoza 80 y.o. female MRN: [id number]  Unit/Bed#: -01 Encounter: [id number]    Telemedicine consent    Patient: Harshad Espinoza  Provider: No name on file  Provider located at [facility]    The patient was identified by name and date of birth. Harshad Espinoza was informed that this is a telemedicine visit and that the visit is being conducted through the [employer]. She agrees to proceed. .  My office door was closed. No one else was in the room. She acknowledged consent and understanding of privacy and security of the video platform. The patient has agreed to participate and understands they can discontinue the visit at any time. Patient is aware this is a billable service. I spent 35 minutes with the patient during this visit. Assessment/Plan     Ms. Susy Vela is a pleasant 79 yo female seen in follow up for significant encephelopathy noted on arrival since resolved. - Etiology for this likely dehydration/ lactic acidosis since resolved, in combination with acute ischemic stroke which likely further lowered her cognitive reserve. Unwitnessed seizure is in the differential also. - Routine EEG shows left temporal dysfunction however no epileptiform discharges noted and no other clear seizure history in the past thus not starting on AED. MRI brain with incidental right cerebellar stroke. - 2/2 stroke prevention: ASA 81 and low dose atorvastatin- pt concerned about a/e and prefers not to take this. D/w patient stroke risk reduction benefit  - Lipid panel is wnl  - HgbA1C 6.1- diet change recommended/ further med recs defer to primary team  - Appreciate PT/OT recommendations  - Recommending FIT to drive eval prior to return to drive- d/w primary team and son and patient until further OP work up clearance/ cannot return to drive.   - ECHOcardiogram unrevealing    Short term memory loss  - Per son more pronounced here, long standing deprssion anxiety which per patient appears to be situational to some degree at least  - Consider OP neuropsychology testing  - B12 deficiency receiving injections here, should follow up with PCP to discuss if further weekly or monthly injections and or PO supplementation sufficient. Goal B12 > 400      Marena December will need follow up in in 6 weeks with general neurology attending physician. She will require a ambulatory EEG within 2-4 weeks. Subjective:   Ms. Coral Sosa is seen in follow up via tele consult on patient/family request. She reports her confusion has resolved. Does not have memory of coming in nor making the phone call to her sister prior to coming in. Son reports similar event occurred in the 1990s when patient's parent passed away. Per son patient's short term memory in the hospital not back to baseline. Pt herself denies any focal weakness  States she normally needs cane or walker to walk due to age/ deconidtioning. States worked with PT here and feels similar to prior in regards to walking    ROS:  10 point ROS completed and negative other than that noted above    Vitals: Blood pressure 155/70, pulse 69, temperature 97.5 °F (36.4 °C), temperature source Temporal, resp. rate 17, height 5' 6" (1.676 m), weight 88 kg (194 lb), SpO2 98 %. ,Body mass index is 31.31 kg/m². Physical Exam:   Gen: NAD  HEENT: NCAT, EOMI  Resp: Symmetric chest rise bl  CVS: RRR  Ext: no edema    AO X person place month year date, immediate recall 3/3 delayed recall in 10 minutes 3/3 no aphasia or dysarthria noted  CN 2-12 grossly intact  Motor: intact antigravity in all ext with no drift  Sensation: Intact to LT in all extremities  Cerebellar: No dysmetria b/l FNF or HS testing  Gait: deferred      Lab, Imaging and other studies: I have personally reviewed pertinent reports. Counseling / Coordination of Care  Total time spent today 40 minutes.  Greater than 50% of total time was spent with the patient and / or family counseling and / or coordination of care.  A description of the counseling / coordination of care: as noted above in A/P d/w patient son at bedside at length and updated primary team

## 2023-12-05 NOTE — DISCHARGE SUMMARY
[address] [facility]  Discharge- Claudette Reyes 1940, 80 y.o. female MRN: [id number]  Unit/Bed#: -Jean Encounter: [id number]  Primary Care Provider: Fanta Menezes DO   Date and time admitted to hospital: 12/2/2023  7:25 AM    * Acute metabolic encephalopathy  Assessment & Plan  POA with slow speech and poor mentation per neighbor/friend. Also with worsening back pain since morning of admission. Son states patient has history of AMS when "traumatic" events occur, and attributes current mentation to severe back pain  Neuro consulted and recommending head ct and CTA. Recommending MRI head and EEG due to AMS and still not at baseline  TSH, UDS, ethanol level, crp, esr normal  UA and procal negative. Afebrile and without leukocytosis  Lactic mildly elevated, given 1L bolus and started on IVF continuous   Flu/covid/rsv negative  C diff and stool samples pending 2/2 diarrhea  Discontinued as patient without any further diarrhea  Psych consulted due to very anxious and tearful exam with poor mentation  Originally denied zoloft, but after discussion willing to try zoloft 25 mg qd. Outpatient follow up with PCP. CT AP and CXR without infectious process  Infectious workup negative thus far  Did have 1/2 BC positive for gram positive cocci - staph epi, suspect contaminant. No infectious symptoms. B12 slightly low, will do 5 days IM injection follows by oral and repeat labs OP in 1 month  Neurology: b12 low, should be around 400 - recommend 1000 mcg injections and then po tabs or weekly/monthly b12 injections outpatient with PCP. Will discharge with tablets and further discussion with PCP for injections. 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 EEG: Left temporal irregular delta activity. This abnormal study is consistent with left temporal non-specific cerebral dysfunction.  No electrographic seizures or interictal epileptiform discharges (seizure tendency) were seen. No diagnostic clinical events were captured. Neuro recommending stroke protocol  Continue to reorient as needed. Delirium precautions  Follow up with neurology in 6 weeks. Repeat EEG outpatient as well. Patient back to baseline mental status - will have fluctuations in short term memory, but answering orientation questions appropriately. Start oral b12 supplements on discharge, will need to discuss with PCP if continuing with oral supplement or  weekly/monthly injections per PCP recommendations to keep B12 >400    Cerebrovascular accident (CVA) [facility])  Assessment & Plan  Presents to ED with slowed speech and poor mentation. CT scan: No acute intracranial CT abnormality. CTA head/neck   No intracranial large vessel occlusion. 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 No hemodynamically significant stenosis or dissection of cervical carotid and vertebral arteries. Severe multinodular thyromegaly. Correlate with prior thyroid work-up/ultrasound. 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 ECHO WNL  Stroke pathway  DVT prophylaxis   PT/OT: III (Minimum Resource Intensity)   Passed bedside swallow eval  SLP: recommending cognitive therapy at next level of care- HH vs OP   ASA 81 mg  Lipitor 40 mg qd  Lipid panel and TSH WNL   Hgb A1C 6.1%   Telemetry, neuro checks   Neurology consult: needs echo, tele, stroke protocol, BP goal normotension due to sx starting on 12/2, q4h neuro checks, asa 81, lipitor 40 for secondary stroke prevention, b12 injections  Continue with ASA 81 mg qd, lipitor 40 mg qd, b12 tablets for low B12 - needs to follow up with PCP for further recommendations. Follow up with neuro in 6 weeks.  Needs repeat EEG in 2-4 weeks  OP follow up in 6-8 weeks with neurology    Chronic low back pain  Assessment & Plan  History of degenerative disc disease with worsening pain since morning of admission when getting out of bed  Lumbar CT showing no acute fracture or malalignment. Chronic degenerative changes   History of bowel incontinence and chronic diarrhea  MRI lumbar spine: Multilevel degenerative disc disease and facet osteoarthritis. Single small right-sided L2-L3 disc herniation. Predominantly mild stenoses. Details above. At L3-L4, degenerative change results in moderate asymmetric right foraminal narrowing with evidence of right L3 nerve root impingement. Correlate clinically for L3 radiculopathy symptoms. Scoliosis and multilevel degenerative spondylolisthesis. Mild, chronic L1 compression fracture. Pain management - heat, toradol, tylenol, and lidoderm patch  PT/OT consulted: III (Minimal intensity)  Neurology recommending: outpatient follow up with spine specialist to see if PT conservative management can help, if any progressive weakness/sensory cahnges then OP EMG. Referral for neurosurgery and spine/pain management given.      Stage 3a chronic kidney disease (CKD) [facility])  Assessment & Plan  Lab Results   Component Value Date    EGFR 69 12/05/2023    EGFR 62 12/04/2023    EGFR 66 12/03/2023    CREATININE 0.79 12/05/2023    CREATININE 0.86 12/04/2023    CREATININE 0.82 12/03/2023   Currently at baseline   Avoid nephrotoxic agent and hypotension      Medical Problems       Resolved Problems  Date Reviewed: 12/5/2023   None       Discharging Physician / Practitioner: Kamran Maki PA-C  PCP: Ioana Jones DO  Admission Date:   Admission Orders (From admission, onward)       Ordered        12/02/23 0959  INPATIENT ADMISSION  Once                          Discharge Date: 12/05/23    Consultations During Hospital Stay:  Neurology, psych, pt/ot/slp    Procedures Performed:   EEG: Background: During waking, there were clearly defined anterior-posterior voltage and frequency gradients and a well formed 10 Hz symmetric posterior dominant rhythm The predominant awake background activity was generalized irregular alpha range activity and occasional left temporal superimposed 1-2 Hz irregular delta activity Drowsiness was appreciated with typical generalized theta range activity Sleep: The patient did not sleep during this recording. Reactivity: The background was reactive to external stimuli. Photic stimulation led to symmetric photic driving. Hyperventilation was deferred. Interictal abnormalities: None Rhythmic/Periodic patterns: None Seizures: None Events:  No push buttons were activated. Other findings: Samples of the single channel ECG demonstrated a normal sinus rhythm. EEG impression: This is an abnormal routine EEG recording due to: Left temporal irregular delta activity. Clinical Interpretation: This abnormal study is consistent with left temporal non-specific cerebral dysfunction. No electrographic seizures or interictal epileptiform discharges (seizure tendency) were seen. No diagnostic clinical events were captured. Significant Findings / Test Results:   Echo: Left Ventricle: Left ventricular cavity size is normal. Wall thickness is mildly increased. There is concentric remodeling. The left ventricular ejection fraction is 65%. Systolic function is normal. Wall motion is normal. Diastolic function is normal.  Mitral Valve: There is moderate thickening of the anterior leaflet. There is systolic anterior motion of the anterior leaflet without late peaking gradient. MRI lumbar spine: Vertebral bodies are numbered such that the 1st conical shape vertebral segment is called S1. Iliolumbar ligaments at L5. 5 nonrib-bearing vertebral bodies, better seen on the CT. VERTEBRAL BODIES: Alignment: Similar dextroscoliosis, mild retrolisthesis at L1-L2 and L2-L3 and degenerative grade 1 anterolisthesis at L4-L5 and L5-S1. Preserved lordosis.  Vertebral body heights: Mild, chronic L1 compression deformity with superior endplate Schmorl's node. No bone retropulsion. No acute fracture. Bone marrow: Mild L1-L2 fibrovascular degenerative endplate change. Multilevel small endplate Schmorl's nodes and minimal fatty endplate change. Benign T10 hemangioma. No suspicious marrow edema or mass. SACRUM: Normal. DISTAL CORD AND CONUS:  Conus and nerve roots are within normal limits. Conus terminates at T12-L1. Tiny sacral perineural root sleeve cyst, usually clinically insignificant. PARASPINAL SOFT TISSUES: Posterior paraspinal muscle fatty infiltration. Similar small peripelvic and larger cortical renal cysts and diverticulosis. LOWER THORACIC DISC SPACES: Small T12-L1 disc bulge. No stenosis. LUMBAR DISC SPACES: Diffuse desiccation. Moderate L1-L2 and mild L2-L3 and L3-L4 loss of height. Scattered vacuum discs. Individual levels: L1-L2: Disc osteophyte asymmetric to the right. Mild facet osteoarthritis. Minimal central canal and mild bilateral foraminal narrowing. L2-L3: Small disc osteophyte and right foraminal disc protrusion. Mild facet arthropathy. Minimal central canal and mild bilateral foraminal narrowing. L3-L4: Small disc bulge. Facet hypertrophy with mild ligamentum flavum infolding. Minimal central canal mild left and moderate right foraminal narrowing. Evidence of mild mass effect on the exiting right L3 nerve root. L4-L5: Mild disc uncovering and bulge. Facet hypertrophy with mild ligamentum flavum infolding. Mild bilateral foraminal narrowing. L5-S1: Disc uncovering and small bulge. Facet hypertrophy. Patent central canal and foramen. IMPRESSION: Multilevel degenerative disc disease and facet osteoarthritis. Single small right-sided L2-L3 disc herniation. Predominantly mild stenoses. Details above. At L3-L4, degenerative change results in moderate asymmetric right foraminal narrowing with evidence of right L3 nerve root impingement.  Correlate clinically for L3 radiculopathy symptoms Scoliosis and multilevel degenerative spondylolisthesis. Mild, chronic L1 compression fracture. MRI brain: BRAIN PARENCHYMA: No  hemorrhage, extra-axial fluid collection, mass effect or midline shift. Mild, focal patchy periventricular and subcortical white matter foci of abnormal T2 and FLAIR hyperintensity are nonspecific, but usually secondary to changes of microangiopathy. Solitary 0.3 cm focus of restricted diffusion in the anterior-inferior right cerebellum (3/5) with T2 and FLAIR hyperintensity. VENTRICLES: Diffuse volume loss, not grossly out of proportion to age. Numerous, mildly dilated perivascular spaces. SELLA AND PITUITARY GLAND:  Within normal limits. ORBITS:  Within normal limits. PARANASAL SINUSES: Right maxillary mucosal retention cyst. VASCULATURE:  Preserved skull base flow voids. CALVARIUM AND SKULL BASE: No acute findings. Multilevel mild degenerative anterolisthesis in the cervical spine. Clear mastoid air cells. EXTRACRANIAL SOFT TISSUES:  Within normal limits. The study was marked in [facility] for immediate notification. IMPRESSION: Recent, acute to subacute, lacunar infarct in the right cerebellum, posterior inferior cerebellar artery territory. XR chest: Mild cardiomegaly. Moderate hiatal hernia. Left paratracheal opacity due to large goiter per comparison with a neck CT. Lungs clear. No effusion or pneumothorax. Upper abdomen normal. Cholecystectomy. Bones normal for age. Impression: No acute cardiopulmonary disease   CT abdomen pelvis: ABDOMEN: LOWER CHEST:  No clinically significant abnormality identified in the visualized lower chest. LIVER/BILIARY TREE:  Unremarkable. GALLBLADDER: Gallbladder is surgically absent. SPLEEN:  Unremarkable. PANCREAS:  Unremarkable. ADRENAL GLANDS:  Unremarkable. KIDNEYS/URETERS: Large low-density renal cysts are identified as well as some smaller lesions more difficult to characterize. One measures up to 11 cm in size at the lower pole of the left kidney.  Contrast is seen within the collecting system without  hydronephrosis. STOMACH AND BOWEL: Moderate hiatus hernia. No small bowel dilatation. Diffuse colon diverticulosis without CT evidence of diverticulitis. . Mild fecal stasis. APPENDIX:  No findings to suggest appendicitis. The appendix is not well seen. ABDOMINOPELVIC CAVITY:  No ascites. No pneumoperitoneum. No lymphadenopathy. VESSELS: Mild atherosclerosis. PELVIS REPRODUCTIVE ORGANS:  Unremarkable for patient's age. URINARY BLADDER: Contrast is seen within the bladder. . ABDOMINAL WALL/INGUINAL REGIONS: Small periumbilical hernia of fat. Right sided hernia fat is identified approaching the right femoral vessels with slight deformity of the vessel noted. This hernia is lateral to the symphysis pubis and may actually represent a femoral hernia rather than an inguinal hernia. This is lateral to the epigastric vessels. OSSEOUS STRUCTURES:  No acute fracture or destructive osseous lesion. Degenerative change lumbar spine with Schmorl's node at L1 and slight loss of height similar to the earlier lumbar study. This is also visible on x-ray from 7/12/2019. Scoliosis is also noted. IMPRESSION: Extensive left colon diverticulosis but without CT evidence of diverticulitis. Numerous renal cysts. No hydronephrosis. Chronic changes of the lumbar spine. Groin hernia on the right with fat may represent a femoral hernia more likely than an inguinal hernia. CTA stroke alert head/neck: CERVICAL VASCULATURE  AORTIC ARCH AND GREAT VESSELS: Aortic arch and great vessel origins are unremarkable. RIGHT VERTEBRAL ARTERY CERVICAL SEGMENT:The vessel origin is unremarkable. The vessel is patent throughout the neck without high-grade stenosis. LEFT VERTEBRAL ARTERY CERVICAL SEGMENT: The vessel origin is unremarkable. The vessel is patent throughout the neck without high-grade stenosis. RIGHT EXTRACRANIAL CAROTID SEGMENT:The carotid bifurcation and cervical internal carotid artery are unremarkable.     No stenosis or dissection. LEFT EXTRACRANIAL CAROTID SEGMENT: Minimal atherosclerotic disease at the bifurcation. No stenosis or dissection. INTRACRANIAL VASCULATURE: INTERNAL CAROTID ARTERIES: Mild atherosclerotic disease of cavernous and supraclinoid segments of bilateral internal carotid arteries without critical stenosis. Normal ophthalmic artery origins. ANTERIOR CIRCULATION: Normal A1 segments. Bilateral anterior cerebral arteries are unremarkable. Normal anterior comminuting artery. MIDDLE CEREBRAL ARTERY CIRCULATION: Bilateral M1 segments and major M2 branches are patent without high-grade stenosis. DISTAL VERTEBRAL ARTERIES: There is focal high-grade stenosis at distal right intracranial vertebral artery (series 3 image 224). Unremarkable left intracranial vertebral artery. Posterior inferior cerebellar artery origins are patent. BASILAR ARTERY:  Basilar artery is unremarkable. Normal superior cerebellar arteries. POSTERIOR CEREBRAL ARTERIES: Persistent fetal origin of left posterior cerebral arteries. Bilateral posterior cerebral arteries are patent without high-grade stenosis. Absent/hypoplastic right posterior communicating artery. DURAL VENOUS SINUSES:  Unremarkable. NON VASCULAR ANATOMY. BONY STRUCTURES: No acute or aggressive appearing osseous abnormality. SOFT TISSUES OF THE NECK: Severe multinodular thyromegaly. THORACIC INLET:  Unremarkable. IMPRESSION: 1. No intracranial large vessel occlusion. 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 2.  No hemodynamically significant stenosis or dissection of cervical carotid and vertebral arteries. 3.  Severe multinodular thyromegaly. Correlate with prior thyroid work-up/ultrasound. CT lumbar spine: ALIGNMENT:  There are 5 lumbar type vertebral bodies. Rightward convex scoliosis apex L3. Slight lateral listhesis at L2-3 and L3-4 with L3 shifted to the right of both the L2 and L4 vertebral bodies.  Superior plate Schmorl's node has a chronic appearance at L1. VERTEBRAE:  No fracture. No lytic or blastic lesion. DEGENERATIVE CHANGES: Lower Thoracic spine:  Normal lower thoracic disc spaces. L1-2:  Normal disc height. No herniation. Normal facet joints. No canal or foraminal stenosis. L2-3: Mild annular bulge without central or foraminal narrowing. L3-4: Moderate facet degenerative change record on the left. No significant central or foraminal narrowing. L4-5: Moderate facet hypertrophy. Small left foraminal protrusion without central or foraminal narrowing. L5-S1: Moderate facet hypertrophy right greater than left. No significant central or foraminal narrowing. PARASPINAL SOFT TISSUES:   Normal. IMPRESSION: No acute fracture or traumatic malalignment. Mild degenerative changes are noted. Rightward convex scoliosis apex L3. CT brain: PARENCHYMA: Nonspecific minimal decreased attenuation involving periventricular and subcortical white matter, most compatible with mild microangiopathic change. No intracranial mass, mass effect or midline shift. No CT signs of acute infarction. No acute parenchymal hemorrhage. Normal intracranial vasculature. VENTRICLES AND EXTRA-AXIAL SPACES:  Normal for the patient's age. VISUALIZED ORBITS: Normal visualized orbits. PARANASAL SINUSES: Small retention cyst within the right maxillary sinus. CALVARIUM AND EXTRACRANIAL SOFT TISSUES: Hyperostosis frontalis interna. IMPRESSION: No acute intracranial CT abnormality. Lactic acid: 3.0 > 4.3 > 4.2 > 2.1 > 0.8  A1c: 6.1%  Lipid panel: cholesterol: 99; triglycerides: 118, HDL: 36, LDL: 39  Rapid UDS: positive oxycodone  TSH: 0.472  Uric acid: 5.5  Acetaminophen level: <2  Ethanol: <10  UA: ketones: 15, small occult blood; microscopic: normal  B12: 180  Ammonia: 16  CRP and ESR normal  Blood culture 1/2: positive for gram positive rods; blood culture identification panel: staph epidermatitis detected; 2nd blood culture no growth at 48 hours.    COVID/Flu/RSV negative    Incidental Findings:   See above    Test Results Pending at Discharge (will require follow up): Blood cultures and folate     Outpatient Tests Requested:  Zio patch/loop recorder with cardiology outpatient  EMG testing of the nerves for the back if worsening numbness or tingling  EEG outpatient in 2 to 4 weeks with neurology  Follow-up with PCP in 1 week  Follow-up with cardiology in 1 week  Follow-up with neurology in 6-week  Follow-up with pain management  Follow-up with neurosurgery  Have fit to drive evaluation program before patient starts driving again    Complications:  none    Reason for Admission: acute metabolic encephalopathy    Hospital Course:   Claudia Habermann is a 80 y.o. female patient who originally presented to the hospital on 12/2/2023 due to confusion and back pain. Patient presented to the ED due to worsening back pain since the morning and confusion. Unclear etiology initially of the AMS, CTA head/neck done and CT brain done without acute abnormality, other results as above. Neurology consulted in ED for stroke alert and did not recommend stroke workup. CT AP without infectious process. Also with lactic acidosis suspected secondary to pain and possibly dehydration. Neurology official consult suspecting possibly anxiety provoked. MRI brain and lumbar spine were ordered with results above. Neuro also recommended EEG. PT/OT saw patient and recommended III (Minimum Resource Intensity). MRI done showing that patient did have a stroke and discussed further with neuro. Recommended stroke workup. Started on ASA 81 and lipitor 40 mg qd. EEG with results above. Patient did have low b12, was started on b12 injections inpatient. Also recommended follow up with spine specialists OP. SLP saw patient as well and recommended cognitive therapy at next level of care [address] [facility] vs OP as she had poor insight to her diagnosis and difficulty with short term memory.   Echo and telemetry were unremarkable inpatient. Neuro on discharge recommending continue with lipitor and ASA. Diet changes for A1c reduction. FIT to drive eval prior to return to driving. Recommend discussing with PCP regarding b12 deficiency and injections outpatient. Needs follow up with neuro in 6 weeks and repeat EEG in 2-4 weeks. Psych also consulted for patient due to increased tearfulness and recommended zoloft 25 mg qd for patient, initially patient declined wanting to try and medications but after family discussion willing to try zoloft. Did also have infectious workup done which was mostly unremarkable, did have 1/2 positive BC for gram positive rods - staph epi, 2nd BC no growth at 48 hours suspect contaminant. No other infectious signs or symptoms - afebrile and no leukocytosis. Patient should follow up with PCP in 1 week. Follow up with cardiology in 1 week for loop recorder/zio patch, follow up with neurology in 6 weeks. Follow up with pain management outpatient, follow up with neurosurgery outpatient. Should have repeat EEG in 2-4 weeks. EMG testing of nerves in back/legs if needed outpatient. Should have Fit to drive eval/testing prior to driving again. CM assisted with [facility] for patient on discharge. Patient with [facility]. Instructed to return if any new or worsening symptoms. Verbalized understanding. Please see above list of diagnoses and related plan for additional information. Condition at Discharge: fair    Discharge Day Visit / Exam:   Subjective:  Patient seen and examined this morning. She states she is feeling much better. She states she does not have as much pain in her back as she did. Feels her strength is improved. Her short term memory is still poor, but overall patient states she feels better. Denies any nausea, vomiting, diarrhea, constipation, abdominal pain, CP, SOB, fever, chills. She is upset over what happened and that she cannot drive currently, but understands why she cannot.  She is happy to be going home.   Vitals: Blood Pressure: 156/70 (12/05/23 0830)  Pulse: 69 (12/05/23 0430)  Temperature: 97.5 °F (36.4 °C) (12/05/23 0830)  Temp Source: Temporal (12/05/23 0830)  Respirations: 17 (12/05/23 0430)  Height: 5' 6" (167.6 cm) (12/04/23 1504)  Weight - Scale: 88 kg (194 lb) (12/04/23 1504)  SpO2: 98 % (12/05/23 0430)  Exam:   Physical Exam  Vitals reviewed. Constitutional:       General: She is not in acute distress. Appearance: She is not ill-appearing or toxic-appearing. HENT:      Head: Normocephalic and atraumatic. Nose: Nose normal.      Mouth/Throat:      Mouth: Mucous membranes are moist.   Eyes:      Pupils: Pupils are equal, round, and reactive to light. Cardiovascular:      Rate and Rhythm: Normal rate and regular rhythm. Heart sounds: No murmur heard. Pulmonary:      Effort: No respiratory distress. Breath sounds: No stridor. No wheezing. Abdominal:      General: Bowel sounds are normal. There is no distension. Palpations: Abdomen is soft. There is no mass. Tenderness: There is no abdominal tenderness. Musculoskeletal:      Right lower leg: No edema. Left lower leg: No edema. Skin:     General: Skin is warm and dry. Neurological:      Mental Status: She is alert and oriented to person, place, and time. Cranial Nerves: No cranial nerve deficit, dysarthria or facial asymmetry. Sensory: No sensory deficit. Motor: No weakness. Comments: Answering orientation questions appropriately, patient with difficulty with short term memory, but long term memory intact. Strength 5/5 bilaterally in upper and lower extremities          Discussion with Family: Updated  (son) at bedside. Discharge instructions/Information to patient and family:   See after visit summary for information provided to patient and family.       Provisions for Follow-Up Care:  See after visit summary for information related to follow-up care and any pertinent home health orders. Mobility at time of Discharge:   Basic Mobility Inpatient Raw Score: 19  JH-HLM Goal: 6: Walk 10 steps or more  JH-HLM Achieved: 7: Walk 25 feet or more  HLM Goal achieved. Continue to encourage appropriate mobility. Disposition:   Home with VNA Services (Reminder: Complete face to face encounter)    Planned Readmission: no     Discharge Statement:  I spent 45 minutes discharging the patient. This time was spent on the day of discharge. I had direct contact with the patient on the day of discharge. Greater than 50% of the total time was spent examining patient, answering all patient questions, arranging and discussing plan of care with patient as well as directly providing post-discharge instructions. Additional time then spent on discharge activities. Discharge Medications:  See after visit summary for reconciled discharge medications provided to patient and/or family.       **Please Note: This note may have been constructed using a voice recognition system**

## 2023-12-05 NOTE — PLAN OF CARE
Problem: Potential for Falls  Goal: Patient will remain free of falls  Description: INTERVENTIONS:  - Educate patient/family on patient safety including physical limitations  - Instruct patient to call for assistance with activity   - Consult OT/PT to assist with strengthening/mobility   - Keep Call bell within reach  - Keep bed low and locked with side rails adjusted as appropriate  - Keep care items and personal belongings within reach  - Initiate and maintain comfort rounds  - Make Fall Risk Sign visible to staff  - Offer Toileting every 2 Hours, in advance of need  - Initiate/Maintain bed/chair alarm  - Obtain necessary fall risk management equipment: non skid socks  - Apply yellow socks and bracelet for high fall risk patients  - Consider moving patient to room near nurses station  Outcome: Progressing     Problem: PAIN - ADULT  Goal: Verbalizes/displays adequate comfort level or baseline comfort level  Description: Interventions:  - Encourage patient to monitor pain and request assistance  - Assess pain using appropriate pain scale  - Administer analgesics based on type and severity of pain and evaluate response  - Implement non-pharmacological measures as appropriate and evaluate response  - Consider cultural and social influences on pain and pain management  - Notify physician/advanced practitioner if interventions unsuccessful or patient reports new pain  Outcome: Progressing     Problem: INFECTION - ADULT  Goal: Absence or prevention of progression during hospitalization  Description: INTERVENTIONS:  - Assess and monitor for signs and symptoms of infection  - Monitor lab/diagnostic results  - Monitor all insertion sites, i.e. indwelling lines, tubes, and drains  - Monitor endotracheal if appropriate and nasal secretions for changes in amount and color  - [place] appropriate cooling/warming therapies per order  - Administer medications as ordered  - Instruct and encourage patient and family to use good hand hygiene technique  - Identify and instruct in appropriate isolation precautions for identified infection/condition  Outcome: Progressing  Goal: Absence of fever/infection during neutropenic period  Description: INTERVENTIONS:  - Monitor WBC    Outcome: Progressing     Problem: SAFETY ADULT  Goal: Patient will remain free of falls  Description: INTERVENTIONS:  - Educate patient/family on patient safety including physical limitations  - Instruct patient to call for assistance with activity   - Consult OT/PT to assist with strengthening/mobility   - Keep Call bell within reach  - Keep bed low and locked with side rails adjusted as appropriate  - Keep care items and personal belongings within reach  - Initiate and maintain comfort rounds  - Make Fall Risk Sign visible to staff  - Offer Toileting every 2 Hours, in advance of need  - Initiate/Maintain bed/chair alarm  - Obtain necessary fall risk management equipment: non skid socks  - Apply yellow socks and bracelet for high fall risk patients  - Consider moving patient to room near nurses station  Outcome: Progressing  Goal: Maintain or return to baseline ADL function  Description: INTERVENTIONS:  -  Assess patient's ability to carry out ADLs; assess patient's baseline for ADL function and identify physical deficits which impact ability to perform ADLs (bathing, care of mouth/teeth, toileting, grooming, dressing, etc.)  - Assess/evaluate cause of self-care deficits   - Assess range of motion  - Assess patient's mobility; develop plan if impaired  - Assess patient's need for assistive devices and provide as appropriate  - Encourage maximum independence but intervene and supervise when necessary  - Involve family in performance of ADLs  - Assess for home care needs following discharge   - Consider OT consult to assist with ADL evaluation and planning for discharge  - Provide patient education as appropriate  Outcome: Progressing  Goal: Maintains/Returns to pre admission functional level  Description: INTERVENTIONS:  - Perform AM-PAC 6 Click Basic Mobility/ Daily Activity assessment daily.  - Set and communicate daily mobility goal to care team and patient/family/caregiver. - Collaborate with rehabilitation services on mobility goals if consulted  - Perform Range of Motion 3 times a day. - Reposition patient every 2 hours. - Dangle patient 2 times a day  - Stand patient 2 times a day  - Ambulate patient 2 times a day  - Out of bed to chair 2 times a day   - Out of bed for meals 2 times a day  - Out of bed for toileting  - Record patient progress and toleration of activity level   Outcome: Progressing     Problem: DISCHARGE PLANNING  Goal: Discharge to home or other facility with appropriate resources  Description: INTERVENTIONS:  - Identify barriers to discharge w/patient and caregiver  - Arrange for needed discharge resources and transportation as appropriate  - Identify discharge learning needs (meds, wound care, etc.)  - Arrange for interpretive services to assist at discharge as needed  - Refer to Case Management Department for coordinating discharge planning if the patient needs post-hospital services based on physician/advanced practitioner order or complex needs related to functional status, cognitive ability, or social support system  Outcome: Progressing     Problem: Knowledge Deficit  Goal: Patient/family/caregiver demonstrates understanding of disease process, treatment plan, medications, and discharge instructions  Description: Complete learning assessment and assess knowledge base.   Interventions:  - Provide teaching at level of understanding  - Provide teaching via preferred learning methods  Outcome: Progressing     Problem: Prexisting or High Potential for Compromised Skin Integrity  Goal: Skin integrity is maintained or improved  Description: INTERVENTIONS:  - Identify patients at risk for skin breakdown  - Assess and monitor skin integrity  - Assess and monitor nutrition and hydration status  - Monitor labs   - Assess for incontinence   - Turn and reposition patient  - Assist with mobility/ambulation  - Relieve pressure over bony prominences  - Avoid friction and shearing  - Provide appropriate hygiene as needed including keeping skin clean and dry  - Evaluate need for skin moisturizer/barrier cream  - Collaborate with interdisciplinary team   - Patient/family teaching  - Consider wound care consult   Outcome: Progressing

## 2023-12-05 NOTE — ASSESSMENT & PLAN NOTE
Lab Results   Component Value Date    EGFR 69 12/05/2023    EGFR 62 12/04/2023    EGFR 66 12/03/2023    CREATININE 0.79 12/05/2023    CREATININE 0.86 12/04/2023    CREATININE 0.82 12/03/2023   Currently at baseline   Avoid nephrotoxic agent and hypotension

## 2023-12-05 NOTE — ASSESSMENT & PLAN NOTE
Presents to ED with slowed speech and poor mentation. CT scan: No acute intracranial CT abnormality. CTA head/neck   No intracranial large vessel occlusion. 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 No hemodynamically significant stenosis or dissection of cervical carotid and vertebral arteries. Severe multinodular thyromegaly. Correlate with prior thyroid work-up/ultrasound. 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 ECHO WNL  Stroke pathway  DVT prophylaxis   PT/OT: III (Minimum Resource Intensity)   Passed bedside swallow eval  SLP: recommending cognitive therapy at next level of care- HH vs OP   ASA 81 mg  Lipitor 40 mg qd  Lipid panel and TSH WNL   Hgb A1C 6.1%   Telemetry, neuro checks   Neurology consult: needs echo, tele, stroke protocol, BP goal normotension due to sx starting on 12/2, q4h neuro checks, asa 81, lipitor 40 for secondary stroke prevention, b12 injections  Continue with ASA 81 mg qd, lipitor 40 mg qd, b12 tablets for low B12 - needs to follow up with PCP for further recommendations. Follow up with neuro in 6 weeks.  Needs repeat EEG in 2-4 weeks  OP follow up in 6-8 weeks with neurology

## 2023-12-05 NOTE — DISCHARGE INSTR - AVS FIRST PAGE
Dear Eris Perez,     It was our pleasure to care for you here at [address]. For follow up as well as any medication refills, we recommend that you follow up with your primary care physician. Here are the most important instructions/ recommendations at discharge:     Notable Medication Adjustments -   Start taking lipitor 40 mg daily  Start taking aspirin 81 mg daily  Start taking zoloft 25 mg daily  Start taking B12 supplements on discharge. Please follow up with PCP if they would like you to continue on injections or oral b12 supplements. Goal B12 level >400  Your A1c was 6.1%, should have dietary modifications on discharge  Testing Required after Discharge -   Can have zio patch/loop recorder with cardiology  EMG testing of the nerves for the back if worsening numbness/tingling  EEG to be done outpatient as well in 2-4 weeks per neurology recommendations  Important follow up information -   Follow up with PCP in 1 week  Follow up with cardiology in 1 week for loop recorder/zio patch  Follow up with neurology in 6 weeks  Follow up with pain management outpatient   Can follow up with neurosurgery as well   PT here was recommending fit to drive eval/program before patient starts driving again. Please discuss with patient's PCP. Other Instructions -   Please continue to take medications as prescribed. You should continue with follow up as recommended. Would avoid driving at this time until you are cleared by PT, PCP, or neurology outpatient. Please return to the ED if you have any new or worsening symptoms. Please return if you have severe pain and are unable to walk. If you have any further confusion or weakness please return to the ED.    Please review this entire after visit summary as additional general instructions including medication list, appointments, activity, diet, any pertinent wound care, and other additional recommendations from your care team that may be provided for you.      Sincerely,     Cheryle Flesher, PA-C

## 2023-12-05 NOTE — PLAN OF CARE
Problem: Potential for Falls  Goal: Patient will remain free of falls  Description: INTERVENTIONS:  - Educate patient/family on patient safety including physical limitations  - Instruct patient to call for assistance with activity   - Consult OT/PT to assist with strengthening/mobility   - Keep Call bell within reach  - Keep bed low and locked with side rails adjusted as appropriate  - Keep care items and personal belongings within reach  - Initiate and maintain comfort rounds  - Make Fall Risk Sign visible to staff  - Offer Toileting every 3 Hours, in advance of need  - Initiate/Maintain bedalarm  - Obtain necessary fall risk management equipment: walker  - Apply yellow socks and bracelet for high fall risk patients  - Consider moving patient to room near nurses station  Outcome: Progressing     Problem: SAFETY ADULT  Goal: Patient will remain free of falls  Description: INTERVENTIONS:  - Educate patient/family on patient safety including physical limitations  - Instruct patient to call for assistance with activity   - Consult OT/PT to assist with strengthening/mobility   - Keep Call bell within reach  - Keep bed low and locked with side rails adjusted as appropriate  - Keep care items and personal belongings within reach  - Initiate and maintain comfort rounds  - Make Fall Risk Sign visible to staff  - Offer Toileting every 3 Hours, in advance of need  - Initiate/Maintain bedalarm  - Obtain necessary fall risk management equipment:  walker  - Apply yellow socks and bracelet for high fall risk patients  - Consider moving patient to room near nurses station  Outcome: Progressing  Goal: Maintain or return to baseline ADL function  Description: INTERVENTIONS:  -  Assess patient's ability to carry out ADLs; assess patient's baseline for ADL function and identify physical deficits which impact ability to perform ADLs (bathing, care of mouth/teeth, toileting, grooming, dressing, etc.)  - Assess/evaluate cause of self-care deficits   - Assess range of motion  - Assess patient's mobility; develop plan if impaired  - Assess patient's need for assistive devices and provide as appropriate  - Encourage maximum independence but intervene and supervise when necessary  - Involve family in performance of ADLs  - Assess for home care needs following discharge   - Consider OT consult to assist with ADL evaluation and planning for discharge  - Provide patient education as appropriate  Outcome: Progressing  Goal: Maintains/Returns to pre admission functional level  Description: INTERVENTIONS:  - Perform AM-PAC 6 Click Basic Mobility/ Daily Activity assessment daily.  - Set and communicate daily mobility goal to care team and patient/family/caregiver. - Collaborate with rehabilitation services on mobility goals if consulted  - Perform Range of Motion 3 times a day. - Reposition patient every 3 hours.   - Dangle patient 3 times a day  - Stand patient 3 times a day  - Ambulate patient 3 times a day  - Out of bed to chair 3 times a day   - Out of bed for meals 3 times a day  - Out of bed for toileting  - Record patient progress and toleration of activity level   Outcome: Progressing     Problem: DISCHARGE PLANNING  Goal: Discharge to home or other facility with appropriate resources  Description: INTERVENTIONS:  - Identify barriers to discharge w/patient and caregiver  - Arrange for needed discharge resources and transportation as appropriate  - Identify discharge learning needs (meds, wound care, etc.)  - Arrange for interpretive services to assist at discharge as needed  - Refer to Case Management Department for coordinating discharge planning if the patient needs post-hospital services based on physician/advanced practitioner order or complex needs related to functional status, cognitive ability, or social support system  Outcome: Progressing     Problem: Knowledge Deficit  Goal: Patient/family/caregiver demonstrates understanding of disease process, treatment plan, medications, and discharge instructions  Description: Complete learning assessment and assess knowledge base.   Interventions:  - Provide teaching at level of understanding  - Provide teaching via preferred learning methods  Outcome: Progressing

## 2023-12-05 NOTE — NURSING NOTE
Discharge instructions reviewed with patient and patient's son Jennjaden Janie. Both verbalized understanding of same and agree to follow up with outpatient appointments.

## 2023-12-05 NOTE — ASSESSMENT & PLAN NOTE
History of degenerative disc disease with worsening pain since morning of admission when getting out of bed  Lumbar CT showing no acute fracture or malalignment. Chronic degenerative changes   History of bowel incontinence and chronic diarrhea  MRI lumbar spine: Multilevel degenerative disc disease and facet osteoarthritis. Single small right-sided L2-L3 disc herniation. Predominantly mild stenoses. Details above. At L3-L4, degenerative change results in moderate asymmetric right foraminal narrowing with evidence of right L3 nerve root impingement. Correlate clinically for L3 radiculopathy symptoms. Scoliosis and multilevel degenerative spondylolisthesis. Mild, chronic L1 compression fracture. Pain management - heat, toradol, tylenol, and lidoderm patch  PT/OT consulted: III (Minimal intensity)  Neurology recommending: outpatient follow up with spine specialist to see if PT conservative management can help, if any progressive weakness/sensory cahnges then OP EMG. Referral for neurosurgery and spine/pain management given.

## 2023-12-05 NOTE — CONSULTS
TeleConsultation - [address] 80 y.o. female MRN: [id number]  Unit/Bed#: -01 Encounter: [id number]        REQUIRED DOCUMENTATION:     1. This service was provided via Telemedicine. 2. Provider located at [facility]. 3. TeleMed provider: Augustina Vee MD.  4. Identify all parties in room with patient during tele consult:  Son  5. Patient was then informed that this was a Telemedicine visit and that the exam was being conducted confidentially over secure lines. My office door was closed. No one else was in the room. Patient acknowledged consent and understanding of privacy and security of the Telemedicine visit, and gave us permission to have the assistant stay in the room in order to assist with the history and to conduct the exam.  I informed the patient that I have reviewed their record in Epic and presented the opportunity for them to ask any questions regarding the visit today. The patient agreed to participate. Assessment/Plan     Present on Admission:  **None**    Assessment:  Pt with no psych history. Son at bedside, at baseline. Pt likely with some depressed mood and anxiety, but not amenable to medications. No psychosis or overt mood disorder noted.     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  Adjustment disorder      Treatment Plan:    Planned Medication Changes:    No meds at this time- will discuss with pcp in future  No psychiatric intervention at this time    Current Medications:     Current Facility-Administered Medications   Medication Dose Route Frequency Provider Last Rate    acetaminophen  975 mg Oral Q6H Maisha Price PA-C      aspirin  81 mg Oral Daily Christine Schmitz PA-C      atorvastatin  40 mg Oral QPM Omaira Bolaños PA-C      cyanocobalamin  1,000 mcg Subcutaneous Daily Maisha Price PA-C      heparin (porcine)  5,000 Units Subcutaneous Q8H [address] Maisha Price PA-C      HYDROmorphone  0.2 mg Intravenous Q6H PRN Maisha GRACIELA Price      lidocaine  1 patch Topical Daily Maisha Price PA-C      ondansetron  4 mg Intravenous Q6H PRN Maisha Price PA-C      oxyCODONE  5 mg Oral Q6H PRN Maisha Price PA-C         Risks / Benefits of Treatment:    Risks, benefits, and possible side effects of medications explained to patient and patient verbalizes understanding. Other treatment modalities recommended as indicated:    outpatient referral  delirium non-pharmacological treatment      Inpatient consult to Psychiatry  Consult performed by: Phuong Huff MD  Consult ordered by: Shameka Rodriguez PA-C        Physician Requesting Consult: Alireza Carrillo MD  Principal Problem:Acute metabolic encephalopathy    Reason for Consult: "Psych consulted due to very anxious and tearful exam with poor mentation"      History of Present Illness    80year old female with no psychiatric history in hospital for altered mental status. [facility] and poor mentation per neighbor/friend. Also with worsening back pain since morning of admission. Son states patient has history of AMS when "traumatic" events occur, and attributes current mentation to severe back pain. Neuro consulted and recommending head ct and CTA. Recommending MRI head and EEG due to AMS and still not at baseline    12/5/: Per Son and Neuro consult: Short term memory loss  - Per son more pronounced here, long standing depression anxiety which per patient appears to be situational to some degree at least  Consider OP neuropsychology testing      Patient seen via telepsych, [employer], agrees to virtual visit  Patient says she had a problem on saturday, “an episode” which she describes acting irrationally and memory loss. Patient says it happened to her once before, in 1993, after watching her mother pass away. Patient says she enjoys listening to the news, but there is so much depressing stuff going on.   She tries to be worldly about things. Patient does not remember being tearful. She has never taken medications for depression or anxiety and does not want any medications. 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 Patient denies any psychiatric history. ? Son Anne Dias- does not remember what happened but does think she is depressed. She lives alone and has medical issues causing her anxiety. Her PCP has offered her meds in past. She does not socialize or do much. Started after her divorce in 12. She was given zyprexa on Sat night which cleared her mind at the time. Denies suicidal/homicidal ideation/plan/attempt, no visual/auditory hallucinations, no death wishes,  no paranoid delusion, and no symptoms of rober and depression. Patient does not report perceptual disturbances , obsessions or delusions.     Psychiatric Review Of Systems:    Denies except for in HPI    Historical Information     Past Psychiatric History:     denies    Substance Abuse History:  denies    Family Psychiatric History:      denies    Social History:  [employer]    Adult son  Lives alone    Traumatic History:   denies    Past Medical History:   Diagnosis Date    Anxiety     Arthritis     Depression     Microscopic hematuria     Renal insufficiency        Medical Review Of Systems:    Review of Systems    Meds/Allergies       Allergies   Allergen Reactions    Levofloxacin Rash     Itching,nausea    Shellfish-Derived Products - Food Allergy Swelling    Moxifloxacin Hives, Rash and Swelling    Penicillins Rash     hives       Objective     Vital signs in last 24 hours:  Temp:  [97.5 °F (36.4 °C)-97.9 °F (36.6 °C)] 97.5 °F (36.4 °C)  HR:  [69-92] 69  Resp:  [17-20] 17  BP: (129-156)/(59-72) 155/70      Intake/Output Summary (Last 24 hours) at 12/5/2023 1207  Last data filed at 12/4/2023 1814  Gross per 24 hour   Intake 180 ml   Output 200 ml   Net -20 ml       Mental Status Evaluation:    Appearance:  age appropriate   Behavior:  normal   Speech: normal   Mood:  normal   Affect:  normal   Language: naming objects   Thought Process:  normal   Associations intact associations   Thought Content:  normal   Perceptual Disturbances: None   Risk Potential: Suicidal Ideations none  Homicidal Ideations none  Potential for Aggression No   Sensorium:  person, place, and time/date   Cognition:  recent and remote memory grossly intact   Consciousness:  alert and awake    Attention: attention span and concentration were age appropriate   Intellect: within normal limits   Fund of Knowledge: awareness of current events:     Insight:  age appropriate   Judgment: age appropriate     Lab Results: I have personally reviewed all pertinent laboratory/tests results. Most Recent Labs:   Lab Results   Component Value Date    WBC 6.86 12/05/2023    RBC 4.22 12/05/2023    HGB 12.7 12/05/2023    HCT 40.6 12/05/2023     12/05/2023    RDW 13.3 12/05/2023    NEUTROABS 6.56 12/02/2023    SODIUM 142 12/05/2023    K 3.5 12/05/2023     (H) 12/05/2023    CO2 25 12/05/2023    BUN 14 12/05/2023    CREATININE 0.79 12/05/2023    GLUC 87 12/05/2023    CALCIUM 8.5 12/05/2023    AST 32 12/05/2023    ALT 13 12/05/2023    ALKPHOS 45 12/05/2023    TP 5.9 (L) 12/05/2023    ALB 3.3 (L) 12/05/2023    TBILI 0.48 12/05/2023    CHOLESTEROL 99 12/05/2023    HDL 36 (L) 12/05/2023    TRIG 118 12/05/2023    LDLCALC 39 12/05/2023    AMMONIA 16 (L) 12/02/2023    KYF1SIYLVUAR 0.472 12/02/2023    HGBA1C 6.1 (H) 12/05/2023     12/05/2023       Imaging Studies: Echo complete w/ contrast if indicated    Result Date: 12/5/2023  Narrative:   Left Ventricle: Left ventricular cavity size is normal. Wall thickness is mildly increased. There is concentric remodeling. The left ventricular ejection fraction is 65%. Systolic function is normal. Wall motion is normal. Diastolic function is normal.   Mitral Valve: There is moderate thickening of the anterior leaflet.  There is systolic anterior motion of the anterior leaflet without late peaking gradient. EEG awake or drowsy routine    Result Date: 2023  Narrative: Table formatting from the original result was not included. Routine EEG Patient Name:  Urszula Ibarra  MRN: [id number] :  1940 File #: [id number] Age: 80 y.o. Ordering Provider: Tara Salas PA-C  Study Start-End: 2023 [id number]             Study type: Routine EEG ICD 10 diagnosis: Transient alteration of awareness R40.4 ------------------------------------------------- Patient History: Urszula Ibarra is a 80 y.o. female with a PMH of transient AMS. EEG ordered to evaluate for encephalopathy. Relevant medications include: No AEDs Sedation: No Intubated: No Paralyzed: No ------------------------------------------------- 32 channel digital recording with electrodes placed according to the International 10-20 system with continuous video, ECG, EOG and the possible addition of T1/T2 electrodes. This study was monitored by a monitoring technologist.  The physician interpreting the study had access to the data throughout the recording. The recording was technically satisfactory. ------------------------------------------------- Description: Background: During waking, there were clearly defined anterior-posterior voltage and frequency gradients and a well formed 10 Hz symmetric posterior dominant rhythm. The predominant awake background activity was generalized irregular alpha range activity and occasional left temporal superimposed 1-2 Hz irregular delta activity. Drowsiness was appreciated with typical generalized theta range activity. Sleep: The patient did not sleep during this recording Reactivity: The background was reactive to external stimuli. Photic stimulation led to symmetric photic driving. Hyperventilation was deferred. Interictal abnormalities: None Rhythmic/Periodic patterns: None Seizures: None Events: No push buttons were activated.  Other findings: Samples of the single channel ECG demonstrated a normal sinus rhythm ------------------------------------------------- EEG impression: This is an abnormal routine EEG recording due to: Left temporal irregular delta activity. Clinical Interpretation: This abnormal study is consistent with left temporal non-specific cerebral dysfunction. No electrographic seizures or interictal epileptiform discharges (seizure tendency) were seen. No diagnostic clinical events were captured. Denise Lange MD [facility]      MRI lumbar spine wo contrast    Result Date: 12/4/2023  Narrative: MRI LUMBAR SPINE WITHOUT CONTRAST INDICATION: lumbar spine. Presented with confusion and severe, acute on chronic back pain. Pain began when trying to get out of bed. No trauma. Negative CT. COMPARISON: 12/2/2023 lumbar spine and abdominopelvic CT TECHNIQUE: Multiplanar, multisequence imaging of the lumbar spine was performed. INTRAVENOUS CONTRAST: Not administered IMAGE QUALITY:  Diagnostic FINDINGS: Vertebral bodies are numbered such that the 1st conical shape vertebral segment is called S1. Iliolumbar ligaments at L5. 5 nonrib-bearing vertebral bodies, better seen on the CT. VERTEBRAL BODIES: Alignment: Similar dextroscoliosis, mild retrolisthesis at L1-L2 and L2-L3 and degenerative grade 1 anterolisthesis at L4-L5 and L5-S1. Preserved lordosis. Vertebral body heights: Mild, chronic L1 compression deformity with superior endplate Schmorl's node. No bone retropulsion. No acute fracture. Bone marrow: Mild L1-L2 fibrovascular degenerative endplate change. Multilevel small endplate Schmorl's nodes and minimal fatty endplate change. Benign T10 hemangioma. No suspicious marrow edema or mass. SACRUM: Normal. DISTAL CORD AND CONUS:  Conus and nerve roots are within normal limits. Conus terminates at T12-L1. Tiny sacral perineural root sleeve cyst, usually clinically insignificant.  PARASPINAL SOFT TISSUES: Posterior paraspinal muscle fatty infiltration. Similar small peripelvic and larger cortical renal cysts and diverticulosis. LOWER THORACIC DISC SPACES: Small T12-L1 disc bulge. No stenosis. LUMBAR DISC SPACES: Diffuse desiccation. Moderate L1-L2 and mild L2-L3 and L3-L4 loss of height. Scattered vacuum discs. Individual levels: L1-L2: Disc osteophyte asymmetric to the right. Mild facet osteoarthritis. Minimal central canal and mild bilateral foraminal narrowing. L2-L3: Small disc osteophyte and right foraminal disc protrusion. Mild facet arthropathy. Minimal central canal and mild bilateral foraminal narrowing. L3-L4: Small disc bulge. Facet hypertrophy with mild ligamentum flavum infolding. Minimal central canal mild left and moderate right foraminal narrowing. Evidence of mild mass effect on the exiting right L3 nerve root. L4-L5: Mild disc uncovering and bulge. Facet hypertrophy with mild ligamentum flavum infolding. Mild bilateral foraminal narrowing. L5-S1: Disc uncovering and small bulge. Facet hypertrophy. Patent central canal and foramen. Impression: Multilevel degenerative disc disease and facet osteoarthritis. Single small right-sided L2-L3 disc herniation. Predominantly mild stenoses. Details above. At L3-L4, degenerative change results in moderate asymmetric right foraminal narrowing with evidence of right L3 nerve root impingement. Correlate clinically for L3 radiculopathy symptoms. Scoliosis and multilevel degenerative spondylolisthesis. Mild, chronic L1 compression fracture. Workstation performed: [id number]     MRI brain wo contrast    Result Date: 12/4/2023  Narrative: MRI BRAIN WITHOUT CONTRAST INDICATION:  stroke. Presented with confusion and severe back pain. Negative CT and CTA. COMPARISON: 12/2/2023 CT TECHNIQUE:  Multiplanar/multisequence MRI of the brain was performed without administration of contrast. IMAGE QUALITY:  Diagnostic.  FINDINGS: BRAIN PARENCHYMA: No  hemorrhage, extra-axial fluid collection, mass effect or midline shift. Mild, focal patchy periventricular and subcortical white matter foci of abnormal T2 and FLAIR hyperintensity are nonspecific, but usually secondary to changes of microangiopathy. Solitary 0.3 cm focus of restricted diffusion in the anterior-inferior right cerebellum (3/5) with T2 and FLAIR hyperintensity. VENTRICLES: Diffuse volume loss, not grossly out of proportion to age. Numerous, mildly dilated perivascular spaces. SELLA AND PITUITARY GLAND:  Within normal limits. ORBITS:  Within normal limits. PARANASAL SINUSES: Right maxillary mucosal retention cyst. VASCULATURE:  Preserved skull base flow voids. CALVARIUM AND SKULL BASE: No acute findings. Multilevel mild degenerative anterolisthesis in the cervical spine. Clear mastoid air cells. EXTRACRANIAL SOFT TISSUES:  Within normal limits. The study was marked in [facility] for immediate notification. 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 Other nonemergent findings above. Workstation performed: [id number]     XR chest portable    Result Date: 12/3/2023  Narrative: CHEST INDICATION:   rule out infection. COMPARISON: CXR 5/21/2014, abdomen CT and CTA neck 12/2/2023. EXAM PERFORMED/VIEWS:  XR CHEST PORTABLE. FINDINGS: Mild cardiomegaly. Moderate hiatal hernia. Left paratracheal opacity due to large goiter per comparison with a neck CT. Lungs clear. No effusion or pneumothorax. Upper abdomen normal. Cholecystectomy. Bones normal for age. Impression: No acute cardiopulmonary disease. Workstation performed: [id number]     CT abdomen pelvis wo contrast    Result Date: 12/2/2023  Narrative: CT ABDOMEN AND PELVIS WITHOUT IV CONTRAST INDICATION:   pain and bowel incontinence.  History of back pain getting worse COMPARISON: CT lumbar from 12/2/2023; CT chest from 10/20/2008; lumbar spine x-ray from 7/12/2019 TECHNIQUE:  CT examination of the abdomen and pelvis was performed without intravenous contrast. Multiplanar 2D reformatted images were created from the source data. Some respiratory motion artifacts and lack of contrast limits the examinations. Contrast  is seen within the collecting system from prior CTA. This examination, like all CT scans performed in the [facility], was performed utilizing techniques to minimize radiation dose exposure, including the use of iterative reconstruction and automated exposure control. Radiation dose length product (DLP) for this visit:  799.41 mGy-cm Enteric contrast was not administered. FINDINGS: ABDOMEN LOWER CHEST:  No clinically significant abnormality identified in the visualized lower chest. LIVER/BILIARY TREE:  Unremarkable. GALLBLADDER: Gallbladder is surgically absent. SPLEEN:  Unremarkable. PANCREAS:  Unremarkable. ADRENAL GLANDS:  Unremarkable. KIDNEYS/URETERS: Large low-density renal cysts are identified as well as some smaller lesions more difficult to characterize. One measures up to 11 cm in size at the lower pole of the left kidney. Contrast is seen within the collecting system without hydronephrosis. STOMACH AND BOWEL: Moderate hiatus hernia. No small bowel dilatation. Diffuse colon diverticulosis without CT evidence of diverticulitis. . Mild fecal stasis. APPENDIX:  No findings to suggest appendicitis. The appendix is not well seen. ABDOMINOPELVIC CAVITY:  No ascites. No pneumoperitoneum. No lymphadenopathy. VESSELS: Mild atherosclerosis. PELVIS REPRODUCTIVE ORGANS:  Unremarkable for patient's age. URINARY BLADDER: Contrast is seen within the bladder. . ABDOMINAL WALL/INGUINAL REGIONS: Small periumbilical hernia of fat. . Right sided hernia fat is identified approaching the right femoral vessels with slight deformity of the vessel noted. This hernia is lateral to the symphysis pubis and may actually represent a femoral hernia rather than an inguinal hernia.  This is lateral to the epigastric vessels. OSSEOUS STRUCTURES:  No acute fracture or destructive osseous lesion. Degenerative change lumbar spine with Schmorl's node at L1 and slight loss of height similar to the earlier lumbar study. This is also visible on x-ray from 7/12/2019. Scoliosis is also noted. Impression: Extensive left colon diverticulosis but without CT evidence of diverticulitis. Numerous renal cysts. No hydronephrosis. Chronic changes of the lumbar spine. Groin hernia on the right with fat may represent a femoral hernia more likely than an inguinal hernia. Workstation performed: [id number]     CT spine lumbar without contrast    Result Date: 12/2/2023  Narrative: CT LUMBAR SPINE INDICATION:   Acute on chronic back pain. . COMPARISON: None. TECHNIQUE:  Contiguous axial images through the lumbar spine were obtained. Sagittal and coronal reconstructions were performed. Radiation dose length product (DLP) for this visit:  1394 mGy-cm . This examination, like all CT scans performed in the [facility], was performed utilizing techniques to minimize radiation dose exposure, including the use of iterative reconstruction and automated exposure control. IMAGE QUALITY:  Diagnostic. FINDINGS: ALIGNMENT:  There are 5 lumbar type vertebral bodies. Rightward convex scoliosis apex L3. Slight lateral listhesis at L2-3 and L3-4 with L3 shifted to the right of both the L2 and L4 vertebral bodies. Superior plate Schmorl's node has a chronic appearance at L1. VERTEBRAE:  No fracture. No lytic or blastic lesion. DEGENERATIVE CHANGES: Lower Thoracic spine:  Normal lower thoracic disc spaces. L1-2:  Normal disc height. No herniation. Normal facet joints. No canal or foraminal stenosis. L2-3: Mild annular bulge without central or foraminal narrowing. L3-4: Moderate facet degenerative change record on the left. No significant central or foraminal narrowing. L4-5: Moderate facet hypertrophy.  Small left foraminal protrusion without central or foraminal narrowing. L5-S1: Moderate facet hypertrophy right greater than left. No significant central or foraminal narrowing. PARASPINAL SOFT TISSUES:   Normal.     Impression: No acute fracture or traumatic malalignment. Mild degenerative changes are noted. Rightward convex scoliosis apex L3. Workstation performed: [id number]     CTA stroke alert (head/neck)    Result Date: 12/2/2023  Narrative: CTA NECK AND BRAIN WITH CONTRAST INDICATION: Stroke Alert COMPARISON:   None. TECHNIQUE:   Post contrast imaging was performed after administration of iodinated contrast through the neck and brain. Post contrast axial 0.625 mm images timed to opacify the arterial system. 3D rendering was performed on an independent workstation. MIP reconstructions performed. Coronal reconstructions were performed of the noncontrast portion of the brain. Radiation dose length product (DLP) for this visit:  364.3 mGy-cm . This examination, like all CT scans performed in the [facility], was performed utilizing techniques to minimize radiation dose exposure, including the use of iterative reconstruction and automated exposure control. IV Contrast:  92 mL of iohexol (OMNIPAQUE) IMAGE QUALITY:   Diagnostic FINDINGS: CERVICAL VASCULATURE AORTIC ARCH AND GREAT VESSELS: Aortic arch and great vessel origins are unremarkable. RIGHT VERTEBRAL ARTERY CERVICAL SEGMENT:The vessel origin is unremarkable. The vessel is patent throughout the neck without high-grade stenosis. LEFT VERTEBRAL ARTERY CERVICAL SEGMENT: The vessel origin is unremarkable. The vessel is patent throughout the neck without high-grade stenosis. RIGHT EXTRACRANIAL CAROTID SEGMENT:The carotid bifurcation and cervical internal carotid artery are unremarkable. No stenosis or dissection. LEFT EXTRACRANIAL CAROTID SEGMENT: Minimal atherosclerotic disease at the bifurcation. No stenosis or dissection.  INTRACRANIAL VASCULATURE INTERNAL CAROTID ARTERIES: Mild atherosclerotic disease of cavernous and supraclinoid segments of bilateral internal carotid arteries without critical stenosis. Normal ophthalmic artery origins. ANTERIOR CIRCULATION:  Normal A1 segments. Bilateral anterior cerebral arteries are unremarkable. Normal anterior comminuting artery. MIDDLE CEREBRAL ARTERY CIRCULATION: Bilateral M1 segments and major M2 branches are patent without high-grade stenosis. DISTAL VERTEBRAL ARTERIES: There is focal high-grade stenosis at distal right intracranial vertebral artery (series 3 image 224). Unremarkable left intracranial vertebral artery. Posterior inferior cerebellar artery origins are patent. BASILAR ARTERY:  Basilar artery is unremarkable. Normal superior cerebellar arteries. POSTERIOR CEREBRAL ARTERIES: Persistent fetal origin of left posterior cerebral arteries. Bilateral posterior cerebral arteries are patent without high-grade stenosis. Absent/hypoplastic right posterior communicating artery. DURAL VENOUS SINUSES:  Unremarkable. NON VASCULAR ANATOMY BONY STRUCTURES: No acute or aggressive appearing osseous abnormality. SOFT TISSUES OF THE NECK: Severe multinodular thyromegaly. THORACIC INLET:  Unremarkable. Impression: 1. No intracranial large vessel occlusion. 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 2.  No hemodynamically significant stenosis or dissection of cervical carotid and vertebral arteries. 3.  Severe multinodular thyromegaly. Correlate with prior thyroid work-up/ultrasound. Findings were directly discussed with Maria A Willis at 8:52 a.m. Workstation performed: [id number]     CT stroke alert brain    Result Date: 12/2/2023  Narrative: CT BRAIN - STROKE ALERT PROTOCOL INDICATION:   Stroke Alert. COMPARISON:  None. TECHNIQUE:  CT examination of the brain was performed. In addition to axial images, coronal reformatted images were created and submitted for interpretation.  Radiation dose length product (DLP) for this visit:  030 66 62 83 mGy-cm . This examination, like all CT scans performed in the [facility], was performed utilizing techniques to minimize radiation dose exposure, including the use of iterative reconstruction and automated exposure control. IMAGE QUALITY:  Diagnostic. FINDINGS: PARENCHYMA: Nonspecific minimal decreased attenuation involving periventricular and subcortical white matter, most compatible with mild microangiopathic change. No intracranial mass, mass effect or midline shift. No CT signs of acute infarction. No acute parenchymal hemorrhage. Normal intracranial vasculature. VENTRICLES AND EXTRA-AXIAL SPACES:  Normal for the patient's age. VISUALIZED ORBITS: Normal visualized orbits. PARANASAL SINUSES: Small retention cyst within the right maxillary sinus. CALVARIUM AND EXTRACRANIAL SOFT TISSUES: Hyperostosis frontalis interna     Impression: No acute intracranial CT abnormality. Findings were directly discussed with Freeman Pruitt at 8:52 a.m. Workstation performed: [id number]     EKG/Pathology/Other Studies: No results found for: "VENTRATE", "ATRIALRATE", "PRINT", "QRSDINT", "QTINT", "QTCINT", "PAXIS", "QRSAXIS", "TWAVEAXIS"     Code Status: Level 3 - DNAR and DNI  Advance Directive and Living Will:      Power of :    POLST:      Screenings:    1. Nutrition Screening  Nutrition Assessment (completed by Staff): Nutrition  Feeding: Able to feed self  Diet Type: Regular/House    2.  Pain Screening  Pain Screening: Pain Assessment  Pain Assessment Tool: 0-10  Pain Score: 0  Pain Location/Orientation: Other (Comment)  Pain Onset/Description: Frequency: Constant/Continuous  Hospital Pain Intervention(s): Medication (See MAR)  Multiple Pain Sites: No  Pain Rating: FLACC (Rest) - Face: Occasional grimace or frown, withdrawn, disinterested, appears sad or worried  Pain Rating: FLACC (Rest) - Legs: Normal position or relaxed  Pain Rating: FLACC (Rest) - Activity: Squirming, shifting back and forth, tense, mildly agitated (eg. head back and forth, aggression), shallow/splinting respirations, intermittent sighs  Pain Rating: FLACC (Rest) - Cry: No cry (awake or asleep)  Pain Rating: FLACC (Rest) - Consolability: Content, relaxed  Score: FLACC (Rest): 2  Pain Rating: FLACC (Activity) - Face: No particular expression or smile  Pain Rating: FLACC (Activity) - Legs: Normal position or relaxed  Pain Rating: FLACC (Activity) - Activity: Lying quietly, normal position, moves easily  Pain Rating: FLACC (Activity) - Cry: No cry (awake or asleep)  Pain Rating: FLACC (Activity) - Consolability: Content, relaxed  Score: FLACC (Activity): 0    3. Suicide Screening  ED Crisis Suicide Risk Assessment:      C-SSRS Screening (Nursing Assessment - recent):      Counseling / Coordination of Care: Total floor / unit time spent today 30 minutes. Greater than 50% of total time was spent with the patient and / or family counseling and / or coordination of care.  A description of the counseling / coordination of care: chart review and RN

## 2023-12-05 NOTE — PLAN OF CARE
Problem: Potential for Falls  Goal: Patient will remain free of falls  Description: INTERVENTIONS:  - Educate patient/family on patient safety including physical limitations  - Instruct patient to call for assistance with activity   - Consult OT/PT to assist with strengthening/mobility   - Keep Call bell within reach  - Keep bed low and locked with side rails adjusted as appropriate  - Keep care items and personal belongings within reach  - Initiate and maintain comfort rounds  - Make Fall Risk Sign visible to staff  - Offer Toileting every . Hours, in advance of need  - Initiate/Maintain . alarm  - Obtain necessary fall risk management equipment: .   - Apply yellow socks and bracelet for high fall risk patients  - Consider moving patient to room near nurses station  12/5/2023 1326 by Roberto Carlos Montoya RN  Outcome: Adequate for Discharge  12/5/2023 1008 by Roberto Carlos Montoya RN  Outcome: Progressing     Problem: PAIN - ADULT  Goal: Verbalizes/displays adequate comfort level or baseline comfort level  Description: Interventions:  - Encourage patient to monitor pain and request assistance  - Assess pain using appropriate pain scale  - Administer analgesics based on type and severity of pain and evaluate response  - Implement non-pharmacological measures as appropriate and evaluate response  - Consider cultural and social influences on pain and pain management  - Notify physician/advanced practitioner if interventions unsuccessful or patient reports new pain  12/5/2023 1326 by Roberto Carlos Montoya RN  Outcome: Adequate for Discharge  12/5/2023 1008 by Roberto Carlos Montoya RN  Outcome: Progressing     Problem: INFECTION - ADULT  Goal: Absence or prevention of progression during hospitalization  Description: INTERVENTIONS:  - Assess and monitor for signs and symptoms of infection  - Monitor lab/diagnostic results  - Monitor all insertion sites, i.e. indwelling lines, tubes, and drains  - Monitor endotracheal if appropriate and nasal secretions for changes in amount and color  - [place] appropriate cooling/warming therapies per order  - Administer medications as ordered  - Instruct and encourage patient and family to use good hand hygiene technique  - Identify and instruct in appropriate isolation precautions for identified infection/condition  12/5/2023 1326 by Unique Bates RN  Outcome: Adequate for Discharge  12/5/2023 1008 by Unique Bates RN  Outcome: Progressing  Goal: Absence of fever/infection during neutropenic period  Description: INTERVENTIONS:  - Monitor WBC    12/5/2023 1326 by Unique Bates RN  Outcome: Adequate for Discharge  12/5/2023 1008 by Unique Bates RN  Outcome: Progressing     Problem: SAFETY ADULT  Goal: Patient will remain free of falls  Description: INTERVENTIONS:  - Educate patient/family on patient safety including physical limitations  - Instruct patient to call for assistance with activity   - Consult OT/PT to assist with strengthening/mobility   - Keep Call bell within reach  - Keep bed low and locked with side rails adjusted as appropriate  - Keep care items and personal belongings within reach  - Initiate and maintain comfort rounds  - Make Fall Risk Sign visible to staff  - Offer Toileting every . Hours, in advance of need  - Initiate/Maintain . alarm  - Obtain necessary fall risk management equipment: . .  - Apply yellow socks and bracelet for high fall risk patients  - Consider moving patient to room near nurses station  12/5/2023 1326 by Unique Bates RN  Outcome: Adequate for Discharge  12/5/2023 1008 by Unique Bates RN  Outcome: Progressing  Goal: Maintain or return to baseline ADL function  Description: INTERVENTIONS:  -  Assess patient's ability to carry out ADLs; assess patient's baseline for ADL function and identify physical deficits which impact ability to perform ADLs (bathing, care of mouth/teeth, toileting, grooming, dressing, etc.)  - Assess/evaluate cause of self-care deficits   - Assess range of motion  - Assess patient's mobility; develop plan if impaired  - Assess patient's need for assistive devices and provide as appropriate  - Encourage maximum independence but intervene and supervise when necessary  - Involve family in performance of ADLs  - Assess for home care needs following discharge   - Consider OT consult to assist with ADL evaluation and planning for discharge  - Provide patient education as appropriate  12/5/2023 1326 by Jaleel Bhatia RN  Outcome: Adequate for Discharge  12/5/2023 1008 by Jaleel Bhatia RN  Outcome: Progressing  Goal: Maintains/Returns to pre admission functional level  Description: INTERVENTIONS:  - Perform AM-PAC 6 Click Basic Mobility/ Daily Activity assessment daily.  - Set and communicate daily mobility goal to care team and patient/family/caregiver. - Collaborate with rehabilitation services on mobility goals if consulted  - Perform Range of Motion . times a day. - Reposition patient every . hours. - Dangle patient . times a day  - Stand patient . times a day  - Ambulate patient . times a day  - Out of bed to chair . . times a day   - Out of bed for meals .  times a day  - Out of bed for toileting  - Record patient progress and toleration of activity level   12/5/2023 1326 by Jaleel Bhatai RN  Outcome: Adequate for Discharge  12/5/2023 1008 by Jaleel Bhatia RN  Outcome: Progressing     Problem: DISCHARGE PLANNING  Goal: Discharge to home or other facility with appropriate resources  Description: INTERVENTIONS:  - Identify barriers to discharge w/patient and caregiver  - Arrange for needed discharge resources and transportation as appropriate  - Identify discharge learning needs (meds, wound care, etc.)  - Arrange for interpretive services to assist at discharge as needed  - Refer to Case Management Department for coordinating discharge planning if the patient needs post-hospital services based on physician/advanced practitioner order or complex needs related to functional status, cognitive ability, or social support system  12/5/2023 1326 by Jillian Browning RN  Outcome: Adequate for Discharge  12/5/2023 1008 by Jillian Browning RN  Outcome: Progressing     Problem: Knowledge Deficit  Goal: Patient/family/caregiver demonstrates understanding of disease process, treatment plan, medications, and discharge instructions  Description: Complete learning assessment and assess knowledge base.   Interventions:  - Provide teaching at level of understanding  - Provide teaching via preferred learning methods  12/5/2023 1326 by Jillian Browning RN  Outcome: Adequate for Discharge  12/5/2023 1008 by Jillian Browning RN  Outcome: Progressing     Problem: Prexisting or High Potential for Compromised Skin Integrity  Goal: Skin integrity is maintained or improved  Description: INTERVENTIONS:  - Identify patients at risk for skin breakdown  - Assess and monitor skin integrity  - Assess and monitor nutrition and hydration status  - Monitor labs   - Assess for incontinence   - Turn and reposition patient  - Assist with mobility/ambulation  - Relieve pressure over bony prominences  - Avoid friction and shearing  - Provide appropriate hygiene as needed including keeping skin clean and dry  - Evaluate need for skin moisturizer/barrier cream  - Collaborate with interdisciplinary team   - Patient/family teaching  - Consider wound care consult   12/5/2023 1326 by Jillian Browning RN  Outcome: Adequate for Discharge  12/5/2023 1008 by Jillian Browning RN  Outcome: Progressing

## 2023-12-05 NOTE — ASSESSMENT & PLAN NOTE
POA with slow speech and poor mentation per neighbor/friend. Also with worsening back pain since morning of admission. Son states patient has history of AMS when "traumatic" events occur, and attributes current mentation to severe back pain  Neuro consulted and recommending head ct and CTA. Recommending MRI head and EEG due to AMS and still not at baseline  TSH, UDS, ethanol level, crp, esr normal  UA and procal negative. Afebrile and without leukocytosis  Lactic mildly elevated, given 1L bolus and started on IVF continuous   Flu/covid/rsv negative  C diff and stool samples pending 2/2 diarrhea  Discontinued as patient without any further diarrhea  Psych consulted due to very anxious and tearful exam with poor mentation  Originally denied zoloft, but after discussion willing to try zoloft 25 mg qd. Outpatient follow up with PCP. CT AP and CXR without infectious process  Infectious workup negative thus far  Did have 1/2 BC positive for gram positive cocci - staph epi, suspect contaminant. No infectious symptoms. B12 slightly low, will do 5 days IM injection follows by oral and repeat labs OP in 1 month  Neurology: b12 low, should be around 400 - recommend 1000 mcg injections and then po tabs or weekly/monthly b12 injections outpatient with PCP. Will discharge with tablets and further discussion with PCP for injections. 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 EEG: Left temporal irregular delta activity. This abnormal study is consistent with left temporal non-specific cerebral dysfunction. No electrographic seizures or interictal epileptiform discharges (seizure tendency) were seen. No diagnostic clinical events were captured. Neuro recommending stroke protocol  Continue to reorient as needed. Delirium precautions  Follow up with neurology in 6 weeks. Repeat EEG outpatient as well.  Patient back to baseline mental status - will have fluctuations in short term memory, but answering orientation questions appropriately.    Start oral b12 supplements on discharge, will need to discuss with PCP if continuing with oral supplement or  weekly/monthly injections per PCP recommendations to keep B12 >400

## 2023-12-07 LAB
ATRIAL RATE: 99 BPM
BACTERIA BLD CULT: NORMAL
P AXIS: 80 DEGREES
PR INTERVAL: 192 MS
QRS AXIS: 3 DEGREES
QRSD INTERVAL: 76 MS
QT INTERVAL: 378 MS
QTC INTERVAL: 485 MS
T WAVE AXIS: 214 DEGREES
VENTRICULAR RATE: 99 BPM

## 2023-12-08 LAB
BACTERIA BLD CULT: ABNORMAL
GRAM STN SPEC: ABNORMAL
GRAM STN SPEC: ABNORMAL
S EPIDERMIDIS DNA BLD POS QL NAA+NON-PRB: DETECTED

## 2023-12-11 LAB
DME PARACHUTE DELIVERY DATE ACTUAL: NORMAL
DME PARACHUTE DELIVERY DATE REQUESTED: NORMAL
DME PARACHUTE ITEM DESCRIPTION: NORMAL
DME PARACHUTE ORDER STATUS: NORMAL
DME PARACHUTE SUPPLIER NAME: NORMAL
DME PARACHUTE SUPPLIER PHONE: NORMAL

## 2023-12-15 ENCOUNTER — TELEPHONE (OUTPATIENT)
Age: 83
End: 2023-12-15

## 2023-12-19 RX ORDER — NAPROXEN SODIUM 220 MG
220 TABLET ORAL
COMMUNITY
End: 2023-12-20

## 2023-12-20 ENCOUNTER — CONSULT (OUTPATIENT)
Dept: PAIN MEDICINE | Facility: CLINIC | Age: 83
End: 2023-12-20
Payer: MEDICARE

## 2023-12-20 VITALS
SYSTOLIC BLOOD PRESSURE: 138 MMHG | TEMPERATURE: 98 F | HEIGHT: 64 IN | OXYGEN SATURATION: 100 % | DIASTOLIC BLOOD PRESSURE: 88 MMHG | BODY MASS INDEX: 32.27 KG/M2 | HEART RATE: 85 BPM | WEIGHT: 189 LBS

## 2023-12-20 DIAGNOSIS — M54.41 CHRONIC MIDLINE LOW BACK PAIN WITH BILATERAL SCIATICA: ICD-10-CM

## 2023-12-20 DIAGNOSIS — M54.42 CHRONIC MIDLINE LOW BACK PAIN WITH BILATERAL SCIATICA: ICD-10-CM

## 2023-12-20 DIAGNOSIS — G89.29 CHRONIC MIDLINE LOW BACK PAIN WITH BILATERAL SCIATICA: ICD-10-CM

## 2023-12-20 DIAGNOSIS — M47.816 LUMBAR SPONDYLOSIS: Primary | ICD-10-CM

## 2023-12-20 PROBLEM — M54.16 LUMBAR RADICULOPATHY: Status: ACTIVE | Noted: 2023-12-20

## 2023-12-20 PROCEDURE — 99204 OFFICE O/P NEW MOD 45 MIN: CPT | Performed by: ANESTHESIOLOGY

## 2023-12-20 RX ORDER — GABAPENTIN 100 MG/1
100 CAPSULE ORAL 3 TIMES DAILY
Qty: 90 CAPSULE | Refills: 2 | Status: SHIPPED | OUTPATIENT
Start: 2023-12-20

## 2023-12-20 RX ORDER — OXYBUTYNIN CHLORIDE 5 MG/1
5 TABLET, EXTENDED RELEASE ORAL DAILY
COMMUNITY
Start: 2023-12-11 | End: 2023-12-20

## 2023-12-20 NOTE — PROGRESS NOTES
Assessment  1. Lumbar spondylosis - Bilateral  -     gabapentin (NEURONTIN) 100 mg capsule; Take 1 capsule (100 mg total) by mouth 3 (three) times a day  -     Ambulatory referral to Physical Therapy; Future    Axial low back pain described primarily by arthritic features.  Aching, nagging, indolent, stabbing, throbbing features in axial low back without radicular components.  5/5 strength bilaterally, negative SLR.  Positive facet loading maneuvers in lumbar spine elicited pain, positive tenderness to palpation over lumbar paraspinal muscles.  Findings correlate with prominent lumbar facet arthropathy seen throughout axial low back on MRI without significant nerve root compression.  Currently she is neurologically intact without gait instability, saddle anesthesia or bowel/bladder abnormality. Risks, benefits alternative to medial branch blocks and subsequent radiofrequency ablation of successful thoroughly discussed with patient.  Handouts provided questions answered to patient satisfaction.    The patient has been experiencing moderate to severe axial spine pain that is causing functional deficit.  The pain has been present for at least 3 months and is not improving with conservative care.  Currently the patient is not experiencing any radicular features nor neurogenic claudication.  Non-facet pathology has been ruled out on clinical evaluation.    Plan  -f/u after 6 weeks PT to consider bilateral L3, L4, L5 medial branch blocks #1  -gabapentin 100 mg t.i.d. Ordered for patient; counseled regarding sedative effects of taking this medication and provided up titration calendar.  Counseled not to take medication while driving or operating heavy machinery/using stairs  -script provided for formal physical therapy for lumbar facet arthropathy; Physician directed home exercise plan as per [employer] demonstrated and handouts provided that patient plans to participate with for 1 hour, twice a week for the next 6 weeks.      There are risks associated with opioid medications, including dependence, addiction and tolerance. The patient understands and agrees to use these medications only as prescribed. Potential side effects of the medications include, but are not limited to, constipation, drowsiness, addiction, impaired judgment and risk of fatal overdose if not taken as prescribed. The patient was warned against driving while taking sedation medications or operating heavy machinery. The patient voiced understanding. Sharing medications is a felony. At this point in time, the patient is showing no signs of addiction, abuse, diversion or suicidal ideation.     [place] Prescription Drug Monitoring Program report was reviewed and was appropriate      Complete risks and benefits including bleeding, infection, tissue reaction, nerve injury and allergic reaction were discussed. The approach was demonstrated using models and literature was provided. Verbal and written consent was obtained.     My impressions and treatment recommendations were discussed in detail with the patient who verbalized understanding and had no further questions.  Discharge instructions were provided. I personally saw and examined the patient and I agree with the above discussed plan of care.    New Medications Ordered This Visit   Medications    gabapentin (NEURONTIN) 100 mg capsule     Sig: Take 1 capsule (100 mg total) by mouth 3 (three) times a day     Dispense:  90 capsule     Refill:  2       History of Present Illness    Linda Goldstein is a 83 y.o. female with pmhx of XOL, depression, recent TIA presenting with chronic low back pain described primarily as arthritic in nature.  She describes 8/10 low back pain that is worse in the mornings and worse at the end of the day.  The pain is characterized by achy, nagging, indolent, crampy, stabbing pain in her axial low back.  The patient describes that the pain is worse with standing for long periods of time  on hard surfaces as well as with walking.  The patient is a very active individual and feels as though this pain compromises his participation with independent activities of daily living. The pain can be debilitating at times and contribute to significant disability, compromising overall activity and independent activities of daily living.  She has initiated physical therapy with limited relief of symptoms.  Medications the patient has tried in the past include nsaids, tylenol. She describes no radicular symptoms and has good strength.  She denies any weakness numbness or paresthesias. The patient denies any bowel or bladder dysfunction, saddle anesthesia or gait instability as well.      I have personally reviewed and/or updated the patient's past medical history, past surgical history, family history, social history, current medications, allergies, and vital signs today.     Review of Systems   Constitutional:  Positive for activity change.   HENT: Negative.     Eyes: Negative.    Respiratory: Negative.     Cardiovascular: Negative.    Gastrointestinal: Negative.    Endocrine: Negative.    Genitourinary: Negative.    Musculoskeletal:  Positive for arthralgias, back pain, gait problem and myalgias.   Skin: Negative.    Allergic/Immunologic: Negative.    Neurological:  Negative for weakness and numbness.   Hematological: Negative.    Psychiatric/Behavioral: Negative.     All other systems reviewed and are negative.      Patient Active Problem List   Diagnosis    Acute metabolic encephalopathy    Chronic low back pain    Stage 3a chronic kidney disease (CKD) ([facility])    Transient alteration of awareness    Cerebrovascular accident (CVA) (Former[facility])    Lumbar spondylosis    Lumbar radiculopathy       Past Medical History:   Diagnosis Date    Anxiety     Arthritis     Depression     Microscopic hematuria     Renal insufficiency        Past Surgical History:   Procedure Laterality Date    ARTHROSCOPY KNEE      CHOLECYSTECTOMY    "   MENISCECTOMY         History reviewed. No pertinent family history.    Social History     Occupational History    Not on file   Tobacco Use    Smoking status: Never    Smokeless tobacco: Never   Vaping Use    Vaping status: Never Used   Substance and Sexual Activity    Alcohol use: Never    Drug use: Never    Sexual activity: Not on file       Current Outpatient Medications on File Prior to Visit   Medication Sig    acetaminophen (TYLENOL) 650 mg CR tablet Take 650 mg by mouth every 8 (eight) hours as needed for mild pain    aspirin (ECOTRIN LOW STRENGTH) 81 mg EC tablet Take 1 tablet (81 mg total) by mouth daily Do not start before December 6, 2023.    atorvastatin (LIPITOR) 40 mg tablet Take 1 tablet (40 mg total) by mouth every evening    sertraline (Zoloft) 25 mg tablet Take 1 tablet (25 mg total) by mouth daily    vitamin B-12 (CYANOCOBALAMIN) 500 MCG TABS Take 1 tablet (500 mcg total) by mouth daily    [DISCONTINUED] naproxen sodium (ALEVE) 220 MG tablet Take 220 mg by mouth (Patient not taking: Reported on 12/20/2023)    [DISCONTINUED] oxybutynin (DITROPAN-XL) 5 mg 24 hr tablet Take 5 mg by mouth daily (Patient not taking: Reported on 12/20/2023)     No current facility-administered medications on file prior to visit.       Allergies   Allergen Reactions    Levofloxacin Rash     Itching,nausea    Bupivacaine Other (See Comments)     Reaction with last injection    Shellfish Allergy - Food Allergy Swelling    Shellfish-Derived Products - Food Allergy Swelling    Moxifloxacin Hives, Rash and Swelling    Penicillins Rash     hives       Physical Exam    /88 (BP Location: Left arm, Patient Position: Sitting, Cuff Size: Adult)   Pulse 85   Temp 98 °F (36.7 °C)   Ht 5' 4\" (1.626 m)   Wt 85.7 kg (189 lb)   SpO2 100%   BMI 32.44 kg/m²     Constitutional: normal, well developed, well nourished, alert, in no distress and non-toxic and no overt pain behavior. and obese  Eyes: anicteric  HEENT: grossly " intact  Neck: supple, symmetric, trachea midline and no masses   Pulmonary:even and unlabored  Cardiovascular:No edema or pitting edema present  Skin:Normal without rashes or lesions and well hydrated  Psychiatric:Mood and affect appropriate  Neurologic:Cranial Nerves II-XII grossly intact Sensation grossly intact; no clonus negative suarez's. Reflexes 2+ and brisk. SLR negative bilaterally.   Musculoskeletal:normal gait. 5/5 strength bilaterally with AROM in lower extremities. Normal heel toe and tip toe walking.Significant pain with lumbar facet loading bilaterally and with lateral spine rotation. ttp over lumbar paraspinal muscles. Negative dillan's test, negative gaenslen's negative SIJ loading bilaterally.    Imaging    Narrative & Impression   MRI LUMBAR SPINE WITHOUT CONTRAST     INDICATION: lumbar spine.   Presented with confusion and severe, acute on chronic back pain. Pain began when trying to get out of bed. No trauma. Negative CT.     COMPARISON: 12/2/2023 lumbar spine and abdominopelvic CT     TECHNIQUE: Multiplanar, multisequence imaging of the lumbar spine was performed.  INTRAVENOUS CONTRAST: Not administered     IMAGE QUALITY:  Diagnostic     FINDINGS:     Vertebral bodies are numbered such that the 1st conical shape vertebral segment is called S1.  Iliolumbar ligaments at L5. 5 nonrib-bearing vertebral bodies, better seen on the CT.        VERTEBRAL BODIES:  Alignment:  Similar dextroscoliosis, mild retrolisthesis at L1-L2 and L2-L3 and degenerative grade 1 anterolisthesis at L4-L5 and L5-S1.     Preserved lordosis.     Vertebral body heights:  Mild, chronic L1 compression deformity with superior endplate Schmorl's node. No bone retropulsion.     No acute fracture.     Bone marrow:  Mild L1-L2 fibrovascular degenerative endplate change. Multilevel small endplate Schmorl's nodes and minimal fatty endplate change.     Benign T10 hemangioma. No suspicious marrow edema or mass.     SACRUM: Normal.      DISTAL CORD AND CONUS:  Conus and nerve roots are within normal limits. Conus terminates at T12-L1. Tiny sacral perineural root sleeve cyst, usually clinically insignificant.     PARASPINAL SOFT TISSUES:  Posterior paraspinal muscle fatty infiltration.     Similar small peripelvic and larger cortical renal cysts and diverticulosis.     LOWER THORACIC DISC SPACES: Small T12-L1 disc bulge. No stenosis.     LUMBAR DISC SPACES: Diffuse desiccation. Moderate L1-L2 and mild L2-L3 and L3-L4 loss of height. Scattered vacuum discs. Individual levels:     L1-L2: Disc osteophyte asymmetric to the right. Mild facet osteoarthritis. Minimal central canal and mild bilateral foraminal narrowing.     L2-L3: Small disc osteophyte and right foraminal disc protrusion. Mild facet arthropathy. Minimal central canal and mild bilateral foraminal narrowing.     L3-L4: Small disc bulge. Facet hypertrophy with mild ligamentum flavum infolding. Minimal central canal mild left and moderate right foraminal narrowing. Evidence of mild mass effect on the exiting right L3 nerve root.     L4-L5: Mild disc uncovering and bulge. Facet hypertrophy with mild ligamentum flavum infolding. Mild bilateral foraminal narrowing.     L5-S1: Disc uncovering and small bulge. Facet hypertrophy. Patent central canal and foramen.     IMPRESSION:     Multilevel degenerative disc disease and facet osteoarthritis. Single small right-sided L2-L3 disc herniation. Predominantly mild stenoses. Details above.     At L3-L4, degenerative change results in moderate asymmetric right foraminal narrowing with evidence of right L3 nerve root impingement. Correlate clinically for L3 radiculopathy symptoms.     Scoliosis and multilevel degenerative spondylolisthesis.     Mild, chronic L1 compression fracture.

## 2023-12-20 NOTE — PATIENT INSTRUCTIONS
Core Strengthening Exercises   WHAT YOU NEED TO KNOW:   Your core includes the muscles of your lower back, hip, pelvis, and abdomen. Core strengthening exercises help heal and strengthen these muscles. This helps prevent another injury, and keeps your pelvis, spine, and hips in the correct position.  DISCHARGE INSTRUCTIONS:   Call your doctor or physical therapist if:   You have sharp or worsening pain during exercise or at rest.    You have questions or concerns about your shoulder exercises.    Safety tips:  Talk to your healthcare provider before you start an exercise program. A physical therapist can teach you how to do core strengthening exercises safely.  Do the exercises on a mat or firm surface.  A firm surface will support your spine and prevent low back pain. Do not do these exercises on a bed.    Move slowly and smoothly.  Avoid fast or jerky motions.    Stop if you feel pain.  You may feel some discomfort at first, but you should not feel pain. Tell your provider or physical therapist if you have pain while you exercise. Regular exercise will help decrease your discomfort over time.    Breathe normally during core exercises.  Do not hold your breath. This may cause an increase in blood pressure and prevent muscle strengthening. Your healthcare provider will tell you when to inhale and exhale during the exercise.    Begin all of your exercises with abdominal bracing.  Abdominal bracing helps warm up your core muscles. You can also practice abdominal bracing throughout the day. Lie on your back with your knees bent and feet flat on the floor. Place your arms in a relaxed position beside your body. Tighten your abdominal muscles. Pull your belly button in and up toward your spine. Hold for 5 seconds. Relax your muscles. Repeat 10 times.       Core strengthening exercises:  Your healthcare provider will tell you how often to do these exercises. The provider will also tell you how many repetitions of each  exercise you should do. Hold each exercise for 5 seconds or as directed. As you get stronger, increase your hold to 10 to 15 seconds. You can do some of these exercises on a stability ball, or with a weight. Ask your healthcare provider how to use a stability ball or weight for these exercises:  Bridging:  Lie on your back with your knees bent and feet flat on the floor. Rest your arms at your side. Tighten your buttocks, and then lift your hips 1 inch off the floor. Hold for 5 seconds. When you can do this exercise without pain for 10 seconds, increase the distance you lift your hips. A good goal is to be able to lift your hips so that your shoulders, hips, and knees are in a straight line.         Dead bug:  Lie on your back with your knees bent and feet flat on the floor. Place your arms in a relaxed position beside your body. Begin with abdominal bracing. Next, raise one leg, keeping your knee bent. Hold for 5 seconds. Repeat with the other leg. When you can do this exercise without pain for 10 to 15 seconds, you may raise one straight leg and hold. Repeat with the other leg.         Quadruped:  Place your hands and knees on the floor. Keep your wrists directly below your shoulders and your knees directly below your hips. Pull your belly button in toward your spine. Do not flatten or arch your back. Tighten your abdominal muscles below your belly button. Hold for 5 seconds. When you can do this exercise without pain for 10 to 15 seconds, you may extend one arm and hold. Repeat on the other side.         Side bridge exercises:      Standing side bridge:  Stand next to a wall and extend one arm toward the wall. Place your palm flat on the wall with your fingers pointing upward. Begin with abdominal bracing. Next, without moving your feet, slowly bend your arm to 90 degrees. Hold for 5 seconds. Repeat on the other side. When you can do this exercise without pain for 10 to 15 seconds, you may do the bent leg side  bridge on the floor.         Bent leg side bridge:  Lie on one side with your legs, hips, and shoulders in a straight line. Prop yourself up onto your forearm so your elbow is directly below your shoulder. Bend your knees back to 90 degrees. Begin with abdominal bracing. Next, lift your hips and balance yourself on your forearm and knees. Hold for 5 seconds. Repeat on the other side. When you can do this exercise without pain for 10 to 15 seconds, you may do the straight leg side bridge on the floor.         Straight leg side bridge:  Lie on one side with your legs, hips, and shoulders in a straight line. Prop yourself up onto your forearm so your elbow is directly below your shoulder. Begin with abdominal bracing. Lift your hips off the floor and balance yourself on your forearm and the outside of your flexed foot. Do not let your ankle bend sideways. Hold for 5 seconds. Repeat on the other side. When you can do this exercise without pain for 10 to 15 seconds, ask your healthcare provider for more advanced exercises.       Superman:  Lie on your stomach. Extend your arms forward on the floor. Tighten your abdominal muscles and lift your right hand and left leg off the floor. Hold this position. Slowly return to the starting position. Tighten your abdominal muscles and lift your left hand and right leg off the floor. Hold this position. Slowly return to the starting position.         Clam:  Lie on your side with your knees bent. Put your bottom arm under your head to keep your neck in line. Put your top hand on your hip to keep your pelvis from moving. Put your heels together, and keep them together during this exercise. Slowly raise your top knee toward the ceiling. Then lower your leg so your knees are together. Repeat this exercise 10 times. Then switch sides and do the exercise 10 times with the other leg.         Curl up:  Lie on your back with your knees bent and feet flat on the floor. Place your hands, palms  down, underneath your lower back. Next, with your elbows on the floor, lift your shoulders and chest 2 to 3 inches off the floor. Keep your head in line with your shoulders. Hold this position. Slowly return to the starting position.         Straight leg raises:  Lie on your back with one leg straight. Bend the other knee and place your foot flat on the floor. Tighten your abdominal muscles. Keep your leg straight and slowly lift it straight up 6 to 12 inches off the floor. Hold this position. Lower your leg slowly. Do as many repetitions as directed on this side. Repeat with the other leg.         Plank:  Lie on your stomach. Bend your elbows and place your forearms flat on the floor. Lift your chest, stomach, and knees off the floor. Make sure your elbows are below your shoulders. Your body should be in a straight line. Do not let your hips or lower back sink to the ground. Squeeze your abdominal muscles together and hold for 15 seconds. To make this exercise harder, hold for 30 seconds or lift 1 leg at a time.         Bicycles:  Lie on your back. Bend both knees and bring them toward your chest. Your calves should be parallel to the floor. Place the palms of your hands on the back of your head. Straighten your right leg and keep it lifted 2 inches off the floor. Raise your head and shoulders off the floor and twist towards your left. Keep your head and shoulders lifted. Bend your right knee while you straighten your left leg. Keep your left leg 2 inches off the floor. Twist your head and chest towards the left leg. Continue to straighten 1 leg at a time and twist.       Follow up with your doctor or physical therapist as directed:  Write down your questions so you remember to ask them during your visits.  © Copyright [employer] 2023 Information is for End User's use only and may not be sold, redistributed or otherwise used for commercial purposes.  The above information is an  only. It is not  intended as medical advice for individual conditions or treatments. Talk to your doctor, nurse or pharmacist before following any medical regimen to see if it is safe and effective for you.    Gabapentin (By mouth)   Gabapentin (gema-a-PEN-tin)  Treats seizures and pain caused by shingles.   Brand Name(s): FusePaq Fanatrex, Neurontin   There may be other brand names for this medicine.  When This Medicine Should Not Be Used:   This medicine is not right for everyone. Do not use it if you had an allergic reaction to gabapentin.  How to Use This Medicine:   Capsule, Liquid, Tablet  Take your medicine as directed. Your dose may need to be changed several times to find what works best for you. If you have epilepsy, do not allow more than 12 hours to pass between doses.  Capsule: Swallow the capsule whole with plenty of water. Do not open, crush, or chew it.  Gralise® tablet: Swallow the tablet whole . Do not crush, break, or chew it.  Neurontin® tablet: If you break a tablet into 2 pieces, use the second half as your next dose. Do not use the half-tablet if the whole tablet has been cut or broken after 28 days.  Oral liquid: Measure the oral liquid medicine with a marked measuring spoon, oral syringe, or medicine cup.  This medicine should come with a Medication Guide. Ask your pharmacist for a copy if you do not have one.  Missed dose: Take a dose as soon as you remember. If it is almost time for your next dose, wait until then and take a regular dose. Do not take extra medicine to make up for a missed dose.  Store the medicine in a closed container at room temperature, away from heat, moisture, and direct light. Store the Neurontin® oral liquid in the refrigerator. Do not freeze.  Drugs and Foods to Avoid:   Ask your doctor or pharmacist before using any other medicine, including over-the-counter medicines, vitamins, and herbal products.  Some medicines can affect how gabapentin works. Tell your doctor if you also using  hydrocodone or morphine.  If you take an antacid, wait at least 2 hours before you take gabapentin.  Do not drink alcohol while you are using this medicine.  Tell your doctor if you use anything else that makes you sleepy. Some examples are allergy medicine, narcotic pain medicine, and alcohol. Tell your doctor if you are also using lorazepam, oxycodone, or zolpidem.  Warnings While Using This Medicine:   Tell your doctor if you are pregnant or breastfeeding, or if you have kidney problems (including patients receiving dialysis) or lung problems. Tell your doctor if you have a history of depression or mental health problems.  This medicine may cause the following problems:  Drug reaction with eosinophilia and systemic symptoms (DRESS) or multiorgan hypersensitivity, which may damage the liver, kidney, blood, heart, or muscles  Changes in mood or behavior, including suicidal thoughts or behavior  Respiratory depression (serious breathing problem that can be life-threatening), when used with narcotic pain medicines  Do not stop using this medicine suddenly. Your doctor will need to slowly decrease your dose before you stop it completely.  This medicine may make you dizzy or drowsy. Do not drive or do anything else that could be dangerous until you know how this medicine affects you.  Tell any doctor or dentist who treats you that you are using this medicine. This medicine may affect certain medical test results.  Your doctor will check your progress and the effects of this medicine at regular visits. Keep all appointments.  Keep all medicine out of the reach of children. Never share your medicine with anyone.  Possible Side Effects While Using This Medicine:   Call your doctor right away if you notice any of these side effects:  Allergic reaction: Itching or hives, swelling in your face or hands, swelling or tingling in your mouth or throat, chest tightness, trouble breathing  Behavior problems, aggression,  restlessness, trouble concentrating, moodiness (especially in children)  Blistering, peeling, red skin rash  Blue lips, fingernails, or skin, chest pain, fast heartbeat, trouble breathing  Change in how much or how often you urinate, bloody or cloudy urine  Dark urine or pale stools, nausea, vomiting, loss of appetite, stomach pain, yellow skin or eyes  Fever, chills, cough, sore throat, body aches  Problems with coordination, shakiness, unsteadiness, unusual eye movement  Rapid weight gain, swelling in your hands, ankles, or feet  Rash, swollen or tender glands in the neck, armpit, or groin  Unusual moods or behaviors, thoughts of hurting yourself, feeling depressed  If you notice these less serious side effects, talk with your doctor:   Dizziness, drowsiness, sleepiness, tiredness  If you notice other side effects that you think are caused by this medicine, tell your doctor.   Call your doctor for medical advice about side effects. You may report side effects to FDA at 3-443-FDA-6550  © Copyright [employer] 2023 Information is for End User's use only and may not be sold, redistributed or otherwise used for commercial purposes.  The above information is an  only. It is not intended as medical advice for individual conditions or treatments. Talk to your doctor, nurse or pharmacist before following any medical regimen to see if it is safe and effective for you.

## 2024-01-12 ENCOUNTER — TELEPHONE (OUTPATIENT)
Dept: NEUROLOGY | Facility: CLINIC | Age: 84
End: 2024-01-12

## 2024-01-12 NOTE — TELEPHONE ENCOUNTER
Called patient and spoke with her regarding HFU. Patient declined to schedule. I did advise her that she can schedule an HFU up to 1 yr from her d/c date, anything after that would be a new patient appt. She verbalized understanding.    Not scheduling HFU at this time     PATIENT HAS HER OWN NEUROLOGIST     Thank you,     Sheyla           HFU/ [facility] POLLACK/ Acute metabolic encephalopathy     DC- 12/05/2024-  HOME       Lindanikia Goldstein will need follow up in in 6 weeks with general neurology attending physician.  She will require a ambulatory EEG within 2-4 weeks.

## 2024-01-29 ENCOUNTER — TELEPHONE (OUTPATIENT)
Dept: OBGYN CLINIC | Facility: HOSPITAL | Age: 84
End: 2024-01-29

## 2024-01-29 NOTE — TELEPHONE ENCOUNTER
Caller: Aimeekarla Jarrett    Doctor/Office: [facility]    Call regarding :  Possibly [place] Appointment.    Call was transferred to: Qian Alvarez

## 2024-02-03 ENCOUNTER — TELEPHONE (OUTPATIENT)
Dept: OTHER | Facility: HOSPITAL | Age: 84
End: 2024-02-03

## 2024-02-03 NOTE — TELEPHONE ENCOUNTER
----- Message from Jennifer Phan sent at 2024  9:53 PM EST -----  Regarding: Lab Value Documentation  2024    Dear Provider,      [facility] is notifying you that an intermittent error has impacted a limited number of HgbA1c specimens. This error was identified in patients meeting specific criteria between 2023, and 2024. Due to the error, patients may have falsely elevated HgbA1c results; this error did not impact other lab results. Please review the below patient's result:    Patient Name: Linda Goldstein   : 1940   MRN: [id number]    Specimen collection date/result: 2023 Hgb A1C 6.1     [facility] recommends repeat testing at no charge, especially for patients whose clinical presentation does not match elevated results. The need for reordering is at your discretion.     For repeat testing, please utilize the order:  [facility] HEMOGLOBIN A1C [[id number]]     Refer questions to the Lab Customer Service Center at [phone], option 1.    Thank you,     Ingrid Winn MT ASCP MHA  Director of Clinical Pathology-Operations  [facility]    Hero Funes MD, FCAP   of Pathology & Laboratory Medicine  [facility]          hard copy, drawn during this pregnancy

## 2024-02-29 RX ORDER — CYCLOSPORINE 0 G/ML
SOLUTION/ DROPS OPHTHALMIC; TOPICAL
COMMUNITY
Start: 2023-12-28

## 2024-03-04 ENCOUNTER — OFFICE VISIT (OUTPATIENT)
Dept: PAIN MEDICINE | Facility: CLINIC | Age: 84
End: 2024-03-04
Payer: MEDICARE

## 2024-03-04 VITALS
OXYGEN SATURATION: 99 % | TEMPERATURE: 97.4 F | SYSTOLIC BLOOD PRESSURE: 124 MMHG | RESPIRATION RATE: 18 BRPM | BODY MASS INDEX: 32.27 KG/M2 | DIASTOLIC BLOOD PRESSURE: 82 MMHG | HEIGHT: 64 IN | WEIGHT: 189 LBS | HEART RATE: 87 BPM

## 2024-03-04 DIAGNOSIS — M47.816 LUMBAR SPONDYLOSIS: ICD-10-CM

## 2024-03-04 DIAGNOSIS — N18.31 STAGE 3A CHRONIC KIDNEY DISEASE (CKD) (HCC): ICD-10-CM

## 2024-03-04 DIAGNOSIS — M54.16 LUMBAR RADICULOPATHY: Primary | ICD-10-CM

## 2024-03-04 PROCEDURE — 99214 OFFICE O/P EST MOD 30 MIN: CPT | Performed by: ANESTHESIOLOGY

## 2024-03-04 RX ORDER — BUPIVACAINE HCL/PF 2.5 MG/ML
10 VIAL (ML) INJECTION ONCE
OUTPATIENT
Start: 2024-03-04 | End: 2024-03-04

## 2024-03-04 RX ORDER — METHYLPREDNISOLONE ACETATE 80 MG/ML
80 INJECTION, SUSPENSION INTRA-ARTICULAR; INTRALESIONAL; INTRAMUSCULAR; PARENTERAL; SOFT TISSUE ONCE
OUTPATIENT
Start: 2024-03-04 | End: 2024-03-04

## 2024-03-04 RX ORDER — LIDOCAINE HYDROCHLORIDE 10 MG/ML
10 INJECTION, SOLUTION EPIDURAL; INFILTRATION; INTRACAUDAL; PERINEURAL ONCE
OUTPATIENT
Start: 2024-03-04 | End: 2024-03-04

## 2024-03-04 RX ORDER — PREGABALIN 25 MG/1
25 CAPSULE ORAL 3 TIMES DAILY
Qty: 90 CAPSULE | Refills: 2 | Status: SHIPPED | OUTPATIENT
Start: 2024-03-04

## 2024-03-04 NOTE — PROGRESS NOTES
Assessment  1. Lumbar spondylosis - Bilateral  -     lyrica 25 mg capsule; Take 1 capsule (100 mg total) by mouth 3 (three) times a day  -     bilateral L3, L4, L5 medial branch blocks #1    Axial low back pain described primarily by arthritic features.  Aching, nagging, indolent, stabbing, throbbing features in axial low back without radicular components.  5/5 strength bilaterally, negative SLR.  Positive facet loading maneuvers in lumbar spine elicited pain, positive tenderness to palpation over lumbar paraspinal muscles.  Findings correlate with prominent lumbar facet arthropathy seen throughout axial low back on MRI without significant nerve root compression. Has completed formal PT.  Currently she is neurologically intact without gait instability, saddle anesthesia or bowel/bladder abnormality. Risks, benefits alternative to medial branch blocks and subsequent radiofrequency ablation of successful thoroughly discussed with patient.  Handouts provided questions answered to patient satisfaction.    The patient has been experiencing moderate to severe axial spine pain that is causing functional deficit.  The pain has been present for at least 3 months and is not improving with conservative care.  Currently the patient is not experiencing any radicular features nor neurogenic claudication.  Non-facet pathology has been ruled out on clinical evaluation.    Plan  -bilateral L3, L4, L5 medial branch blocks #1; f/u 2 weeks post procedure  -gabapentin transitioned to lyrica 25 mg t.i.d. Ordered for patient; counseled regarding sedative effects of taking this medication and provided up titration calendar.  Counseled not to take medication while driving or operating heavy machinery/using stairs  -script provided for formal physical therapy for lumbar facet arthropathy; Physician directed home exercise plan as per [employer] demonstrated and handouts provided that patient plans to participate with for 1 hour, twice a week  for the next 6 weeks.     There are risks associated with opioid medications, including dependence, addiction and tolerance. The patient understands and agrees to use these medications only as prescribed. Potential side effects of the medications include, but are not limited to, constipation, drowsiness, addiction, impaired judgment and risk of fatal overdose if not taken as prescribed. The patient was warned against driving while taking sedation medications or operating heavy machinery. The patient voiced understanding. Sharing medications is a felony. At this point in time, the patient is showing no signs of addiction, abuse, diversion or suicidal ideation.     [place] Prescription Drug Monitoring Program report was reviewed and was appropriate      Complete risks and benefits including bleeding, infection, tissue reaction, nerve injury and allergic reaction were discussed. The approach was demonstrated using models and literature was provided. Verbal and written consent was obtained.     My impressions and treatment recommendations were discussed in detail with the patient who verbalized understanding and had no further questions.  Discharge instructions were provided. I personally saw and examined the patient and I agree with the above discussed plan of care.    New Medications Ordered This Visit   Medications    Cequa 0.09 % SOLN     Sig: instill 1 drop into both eyes twice a day    NON FORMULARY     Sig: Neuvesta       History of Present Illness    Linda Goldstein is a 83 y.o. female with pmhx of XOL, depression, recent TIA presenting with chronic low back pain described primarily as arthritic in nature.  She describes 8/10 low back pain that is worse in the mornings and worse at the end of the day.  The pain is characterized by achy, nagging, indolent, crampy, stabbing pain in her axial low back.  The patient describes that the pain is worse with standing for long periods of time on hard surfaces as  well as with walking.  The patient is a very active individual and feels as though this pain compromises his participation with independent activities of daily living. The pain can be debilitating at times and contribute to significant disability, compromising overall activity and independent activities of daily living.  She has completed physical therapy with limited relief of symptoms.  Medications the patient has tried in the past include nsaids, tylenol. She describes no radicular symptoms and has good strength.  She denies any weakness numbness or paresthesias. The patient denies any bowel or bladder dysfunction, saddle anesthesia or gait instability as well.      I have personally reviewed and/or updated the patient's past medical history, past surgical history, family history, social history, current medications, allergies, and vital signs today.     Review of Systems   Constitutional:  Positive for activity change.   HENT: Negative.     Eyes: Negative.    Respiratory: Negative.     Cardiovascular: Negative.    Gastrointestinal: Negative.    Endocrine: Negative.    Genitourinary: Negative.    Musculoskeletal:  Positive for arthralgias, back pain, gait problem and myalgias.   Skin: Negative.    Allergic/Immunologic: Negative.    Neurological:  Negative for weakness and numbness.   Hematological: Negative.    Psychiatric/Behavioral: Negative.     All other systems reviewed and are negative.      Patient Active Problem List   Diagnosis    Acute metabolic encephalopathy    Chronic low back pain    Stage 3a chronic kidney disease (CKD) ([facility])    Transient alteration of awareness    Cerebrovascular accident (CVA) ([facility])    Lumbar spondylosis    Lumbar radiculopathy       Past Medical History:   Diagnosis Date    Anxiety     Arthritis     Depression     Microscopic hematuria     Renal insufficiency        Past Surgical History:   Procedure Laterality Date    ARTHROSCOPY KNEE      CHOLECYSTECTOMY      MENISCECTOMY    "      History reviewed. No pertinent family history.    Social History     Occupational History    Not on file   Tobacco Use    Smoking status: Never    Smokeless tobacco: Never   Vaping Use    Vaping status: Never Used   Substance and Sexual Activity    Alcohol use: Never    Drug use: Never    Sexual activity: Not on file       Current Outpatient Medications on File Prior to Visit   Medication Sig    acetaminophen (TYLENOL) 650 mg CR tablet Take 650 mg by mouth every 8 (eight) hours as needed for mild pain    aspirin (ECOTRIN LOW STRENGTH) 81 mg EC tablet Take 1 tablet (81 mg total) by mouth daily Do not start before December 6, 2023.    atorvastatin (LIPITOR) 40 mg tablet Take 1 tablet (40 mg total) by mouth every evening    Cequa 0.09 % SOLN instill 1 drop into both eyes twice a day    gabapentin (NEURONTIN) 100 mg capsule Take 1 capsule (100 mg total) by mouth 3 (three) times a day    NON FORMULARY Neuvesta    sertraline (Zoloft) 25 mg tablet Take 1 tablet (25 mg total) by mouth daily    vitamin B-12 (CYANOCOBALAMIN) 500 MCG TABS Take 1 tablet (500 mcg total) by mouth daily     No current facility-administered medications on file prior to visit.       Allergies   Allergen Reactions    Levofloxacin Rash     Itching,nausea    Bupivacaine Other (See Comments)     Reaction with last injection    Shellfish Allergy - Food Allergy Swelling    Shellfish-Derived Products - Food Allergy Swelling    Moxifloxacin Hives, Rash and Swelling    Penicillins Rash     hives       Physical Exam    /82 (BP Location: Left arm, Patient Position: Sitting, Cuff Size: Adult)   Pulse 87   Temp (!) 97.4 °F (36.3 °C)   Resp 18   Ht 5' 4\" (1.626 m)   Wt 85.7 kg (189 lb)   SpO2 99%   BMI 32.44 kg/m²     Constitutional: normal, well developed, well nourished, alert, in no distress and non-toxic and no overt pain behavior. and obese  Eyes: anicteric  HEENT: grossly intact  Neck: supple, symmetric, trachea midline and no masses "   Pulmonary:even and unlabored  Cardiovascular:No edema or pitting edema present  Skin:Normal without rashes or lesions and well hydrated  Psychiatric:Mood and affect appropriate  Neurologic:Cranial Nerves II-XII grossly intact Sensation grossly intact; no clonus negative suarez's. Reflexes 2+ and brisk. SLR negative bilaterally.   Musculoskeletal:normal gait. 5/5 strength bilaterally with AROM in lower extremities. Normal heel toe and tip toe walking.Significant pain with lumbar facet loading bilaterally and with lateral spine rotation. ttp over lumbar paraspinal muscles. Negative dillan's test, negative gaenslen's negative SIJ loading bilaterally.    Imaging    Narrative & Impression   MRI LUMBAR SPINE WITHOUT CONTRAST     INDICATION: lumbar spine.   Presented with confusion and severe, acute on chronic back pain. Pain began when trying to get out of bed. No trauma. Negative CT.     COMPARISON: 12/2/2023 lumbar spine and abdominopelvic CT     TECHNIQUE: Multiplanar, multisequence imaging of the lumbar spine was performed.  INTRAVENOUS CONTRAST: Not administered     IMAGE QUALITY:  Diagnostic     FINDINGS:     Vertebral bodies are numbered such that the 1st conical shape vertebral segment is called S1.  Iliolumbar ligaments at L5. 5 nonrib-bearing vertebral bodies, better seen on the CT.        VERTEBRAL BODIES:  Alignment:  Similar dextroscoliosis, mild retrolisthesis at L1-L2 and L2-L3 and degenerative grade 1 anterolisthesis at L4-L5 and L5-S1.     Preserved lordosis.     Vertebral body heights:  Mild, chronic L1 compression deformity with superior endplate Schmorl's node. No bone retropulsion.     No acute fracture.     Bone marrow:  Mild L1-L2 fibrovascular degenerative endplate change. Multilevel small endplate Schmorl's nodes and minimal fatty endplate change.     Benign T10 hemangioma. No suspicious marrow edema or mass.     SACRUM: Normal.     DISTAL CORD AND CONUS:  Conus and nerve roots are within normal  limits. Conus terminates at T12-L1. Tiny sacral perineural root sleeve cyst, usually clinically insignificant.     PARASPINAL SOFT TISSUES:  Posterior paraspinal muscle fatty infiltration.     Similar small peripelvic and larger cortical renal cysts and diverticulosis.     LOWER THORACIC DISC SPACES: Small T12-L1 disc bulge. No stenosis.     LUMBAR DISC SPACES: Diffuse desiccation. Moderate L1-L2 and mild L2-L3 and L3-L4 loss of height. Scattered vacuum discs. Individual levels:     L1-L2: Disc osteophyte asymmetric to the right. Mild facet osteoarthritis. Minimal central canal and mild bilateral foraminal narrowing.     L2-L3: Small disc osteophyte and right foraminal disc protrusion. Mild facet arthropathy. Minimal central canal and mild bilateral foraminal narrowing.     L3-L4: Small disc bulge. Facet hypertrophy with mild ligamentum flavum infolding. Minimal central canal mild left and moderate right foraminal narrowing. Evidence of mild mass effect on the exiting right L3 nerve root.     L4-L5: Mild disc uncovering and bulge. Facet hypertrophy with mild ligamentum flavum infolding. Mild bilateral foraminal narrowing.     L5-S1: Disc uncovering and small bulge. Facet hypertrophy. Patent central canal and foramen.     IMPRESSION:     Multilevel degenerative disc disease and facet osteoarthritis. Single small right-sided L2-L3 disc herniation. Predominantly mild stenoses. Details above.     At L3-L4, degenerative change results in moderate asymmetric right foraminal narrowing with evidence of right L3 nerve root impingement. Correlate clinically for L3 radiculopathy symptoms.     Scoliosis and multilevel degenerative spondylolisthesis.     Mild, chronic L1 compression fracture.

## 2024-03-04 NOTE — PATIENT INSTRUCTIONS
Core Strengthening Exercises   WHAT YOU NEED TO KNOW:   Your core includes the muscles of your lower back, hip, pelvis, and abdomen. Core strengthening exercises help heal and strengthen these muscles. This helps prevent another injury, and keeps your pelvis, spine, and hips in the correct position.  DISCHARGE INSTRUCTIONS:   Call your doctor or physical therapist if:   You have sharp or worsening pain during exercise or at rest.    You have questions or concerns about your shoulder exercises.    Safety tips:  Talk to your healthcare provider before you start an exercise program. A physical therapist can teach you how to do core strengthening exercises safely.  Do the exercises on a mat or firm surface.  A firm surface will support your spine and prevent low back pain. Do not do these exercises on a bed.    Move slowly and smoothly.  Avoid fast or jerky motions.    Stop if you feel pain.  You may feel some discomfort at first, but you should not feel pain. Tell your provider or physical therapist if you have pain while you exercise. Regular exercise will help decrease your discomfort over time.    Breathe normally during core exercises.  Do not hold your breath. This may cause an increase in blood pressure and prevent muscle strengthening. Your healthcare provider will tell you when to inhale and exhale during the exercise.    Begin all of your exercises with abdominal bracing.  Abdominal bracing helps warm up your core muscles. You can also practice abdominal bracing throughout the day. Lie on your back with your knees bent and feet flat on the floor. Place your arms in a relaxed position beside your body. Tighten your abdominal muscles. Pull your belly button in and up toward your spine. Hold for 5 seconds. Relax your muscles. Repeat 10 times.       Core strengthening exercises:  Your healthcare provider will tell you how often to do these exercises. The provider will also tell you how many repetitions of each  exercise you should do. Hold each exercise for 5 seconds or as directed. As you get stronger, increase your hold to 10 to 15 seconds. You can do some of these exercises on a stability ball, or with a weight. Ask your healthcare provider how to use a stability ball or weight for these exercises:  Bridging:  Lie on your back with your knees bent and feet flat on the floor. Rest your arms at your side. Tighten your buttocks, and then lift your hips 1 inch off the floor. Hold for 5 seconds. When you can do this exercise without pain for 10 seconds, increase the distance you lift your hips. A good goal is to be able to lift your hips so that your shoulders, hips, and knees are in a straight line.         Dead bug:  Lie on your back with your knees bent and feet flat on the floor. Place your arms in a relaxed position beside your body. Begin with abdominal bracing. Next, raise one leg, keeping your knee bent. Hold for 5 seconds. Repeat with the other leg. When you can do this exercise without pain for 10 to 15 seconds, you may raise one straight leg and hold. Repeat with the other leg.         Quadruped:  Place your hands and knees on the floor. Keep your wrists directly below your shoulders and your knees directly below your hips. Pull your belly button in toward your spine. Do not flatten or arch your back. Tighten your abdominal muscles below your belly button. Hold for 5 seconds. When you can do this exercise without pain for 10 to 15 seconds, you may extend one arm and hold. Repeat on the other side.         Side bridge exercises:      Standing side bridge:  Stand next to a wall and extend one arm toward the wall. Place your palm flat on the wall with your fingers pointing upward. Begin with abdominal bracing. Next, without moving your feet, slowly bend your arm to 90 degrees. Hold for 5 seconds. Repeat on the other side. When you can do this exercise without pain for 10 to 15 seconds, you may do the bent leg side  bridge on the floor.         Bent leg side bridge:  Lie on one side with your legs, hips, and shoulders in a straight line. Prop yourself up onto your forearm so your elbow is directly below your shoulder. Bend your knees back to 90 degrees. Begin with abdominal bracing. Next, lift your hips and balance yourself on your forearm and knees. Hold for 5 seconds. Repeat on the other side. When you can do this exercise without pain for 10 to 15 seconds, you may do the straight leg side bridge on the floor.         Straight leg side bridge:  Lie on one side with your legs, hips, and shoulders in a straight line. Prop yourself up onto your forearm so your elbow is directly below your shoulder. Begin with abdominal bracing. Lift your hips off the floor and balance yourself on your forearm and the outside of your flexed foot. Do not let your ankle bend sideways. Hold for 5 seconds. Repeat on the other side. When you can do this exercise without pain for 10 to 15 seconds, ask your healthcare provider for more advanced exercises.       Superman:  Lie on your stomach. Extend your arms forward on the floor. Tighten your abdominal muscles and lift your right hand and left leg off the floor. Hold this position. Slowly return to the starting position. Tighten your abdominal muscles and lift your left hand and right leg off the floor. Hold this position. Slowly return to the starting position.         Clam:  Lie on your side with your knees bent. Put your bottom arm under your head to keep your neck in line. Put your top hand on your hip to keep your pelvis from moving. Put your heels together, and keep them together during this exercise. Slowly raise your top knee toward the ceiling. Then lower your leg so your knees are together. Repeat this exercise 10 times. Then switch sides and do the exercise 10 times with the other leg.         Curl up:  Lie on your back with your knees bent and feet flat on the floor. Place your hands, palms  down, underneath your lower back. Next, with your elbows on the floor, lift your shoulders and chest 2 to 3 inches off the floor. Keep your head in line with your shoulders. Hold this position. Slowly return to the starting position.         Straight leg raises:  Lie on your back with one leg straight. Bend the other knee and place your foot flat on the floor. Tighten your abdominal muscles. Keep your leg straight and slowly lift it straight up 6 to 12 inches off the floor. Hold this position. Lower your leg slowly. Do as many repetitions as directed on this side. Repeat with the other leg.         Plank:  Lie on your stomach. Bend your elbows and place your forearms flat on the floor. Lift your chest, stomach, and knees off the floor. Make sure your elbows are below your shoulders. Your body should be in a straight line. Do not let your hips or lower back sink to the ground. Squeeze your abdominal muscles together and hold for 15 seconds. To make this exercise harder, hold for 30 seconds or lift 1 leg at a time.         Bicycles:  Lie on your back. Bend both knees and bring them toward your chest. Your calves should be parallel to the floor. Place the palms of your hands on the back of your head. Straighten your right leg and keep it lifted 2 inches off the floor. Raise your head and shoulders off the floor and twist towards your left. Keep your head and shoulders lifted. Bend your right knee while you straighten your left leg. Keep your left leg 2 inches off the floor. Twist your head and chest towards the left leg. Continue to straighten 1 leg at a time and twist.       Follow up with your doctor or physical therapist as directed:  Write down your questions so you remember to ask them during your visits.  © Copyright [employer] 2023 Information is for End User's use only and may not be sold, redistributed or otherwise used for commercial purposes.  The above information is an  only. It is not  intended as medical advice for individual conditions or treatments. Talk to your doctor, nurse or pharmacist before following any medical regimen to see if it is safe and effective for you.    Pregabalin (By mouth)   Pregabalin (pre-GA-ba-etta)  Treats nerve and muscle pain, including fibromyalgia. Also treats partial-onset seizures.   Brand Name(s): Lyrica, Lyrica CR   There may be other brand names for this medicine.  When This Medicine Should Not Be Used:   This medicine is not right for everyone. Do not use it if you had an allergic reaction to pregabalin.  How to Use This Medicine:   Capsule, Liquid, Long Acting Tablet  Take your medicine as directed. Your dose may need to be changed several times to find what works best for you.  Extended-release tablet: Swallow the extended-release tablet whole. Do not crush, break, or chew it. Take it after an evening meal.  Oral liquid: Measure the oral liquid medicine with a marked measuring spoon, oral syringe, or medicine cup.  This medicine should come with a Medication Guide. Ask your pharmacist for a copy if you do not have one.  Missed dose: Take a dose as soon as you remember. If it is almost time for your next dose, wait until then and take a regular dose. Do not take extra medicine to make up for a missed dose. If you miss a dose of the extended-release tablet after your evening meal, take it before bedtime after a snack. If you miss the dose before bedtime, take it after your morning meal. If you do not take the dose the following morning, then take the next dose at your regular time after your evening meal. Do not take 2 doses at the same time.  Store the medicine in a closed container at room temperature, away from heat, moisture, and direct light.  Drugs and Foods to Avoid:   Ask your doctor or pharmacist before using any other medicine, including over-the-counter medicines, vitamins, and herbal products.  Some medicines can affect how pregabalin works. Tell  your doctor if you are using any of the following:   ACE inhibitor (including benazepril, enalapril, lisinopril, quinapril, ramipril)  Oral diabetes medicine (including metformin, pioglitazone, rosiglitazone)  Do not drink alcohol while you are using this medicine.  Tell your doctor if you use anything else that makes you sleepy. Some examples are allergy medicine, narcotic pain medicine, and alcohol. Tell your doctor if you are also using oxycodone, lorazepam, or zolpidem.  Warnings While Using This Medicine:   Tell your doctor if you are pregnant or breastfeeding, or if you have kidney disease, heart failure, heart rhythm problems, lung or breathing problems, a bleeding disorder, diabetes, sores or skin problems, or a low blood platelet count. Tell your doctor if you have a history of angioedema (severe swelling), alcohol or drug abuse, depression, or other mood problems.  This medicine may cause the following problems:   Angioedema (severe swelling), which may be life-threatening  Changes in mood or behavior, including suicidal thoughts or behavior  Respiratory depression (serious breathing problem that can be life-threatening), when used with narcotic pain medicines  Peripheral edema (swelling of your hands, ankles, feet, or lower legs)  Increased risk for cancer and bleeding  Serious muscle problems  Heart rhythm changes  This medicine may make you dizzy or drowsy. It may also cause blurry or double vision. Do not drive or do anything else that could be dangerous until you know how this medicine affects you.  Do not stop using this medicine suddenly. Your doctor will need to slowly decrease your dose before you stop it completely.  Your doctor will do lab tests at regular visits to check on the effects of this medicine. Keep all appointments.  Keep all medicine out of the reach of children. Never share your medicine with anyone.  Possible Side Effects While Using This Medicine:   Call your doctor right away if  you notice any of these side effects:  Allergic reaction: Itching or hives, swelling in your face or hands, swelling or tingling in your mouth or throat, chest tightness, trouble breathing  Blistering, peeling, red skin rash  Blue lips, fingernails, or skin, trouble breathing, chest pain  Blurry or double vision  Fever, chills, cough, sore throat, body aches  Muscle pain, tenderness, or weakness, general feeling of illness  Rapid weight gain, swelling in your hands, ankles, or feet  Severe dizziness or drowsiness  Sudden mood changes, unusual moods or behavior, including extreme happiness or depression, thoughts or attempts of killing oneself  Swelling in your throat, head, or neck  Uneven heartbeat  Unusual bleeding, bruising, or weakness  If you notice these less serious side effects, talk with your doctor:   Confusion, trouble concentrating  Constipation  Dry mouth  If you notice other side effects that you think are caused by this medicine, tell your doctor.   Call your doctor for medical advice about side effects. You may report side effects to FDA at 0-448-FDA-5064  © Copyright [employer] 2023 Information is for End User's use only and may not be sold, redistributed or otherwise used for commercial purposes.  The above information is an  only. It is not intended as medical advice for individual conditions or treatments. Talk to your doctor, nurse or pharmacist before following any medical regimen to see if it is safe and effective for you.

## 2024-03-05 ENCOUNTER — TELEPHONE (OUTPATIENT)
Dept: PAIN MEDICINE | Facility: CLINIC | Age: 84
End: 2024-03-05

## 2024-03-05 NOTE — TELEPHONE ENCOUNTER
PA for PREGABLIN Approved   Date(s) approved UNTIL 03/05/2025      Patient advised by [x] MyChart Message                      [] Phone call       Pharmacy advised by [x]Fax                                     []Phone call    Approval letter scanned into Media Yes

## 2024-03-05 NOTE — TELEPHONE ENCOUNTER
PA for PREGABALIN    Submitted via    [x]CMM-KEY [id number]      []Surescripts-Case ID #   []Faxed to plan   []Other website   []Phone call Case ID #     Office notes sent, clinical questions answered. Awaiting determination    Turnaround time for your insurance to make a decision on your Prior Authorization can take 7-21 business days.

## 2024-03-26 ENCOUNTER — TELEPHONE (OUTPATIENT)
Age: 84
End: 2024-03-26

## 2024-03-26 NOTE — TELEPHONE ENCOUNTER
Caller: Linda    Doctor: Annabella    Reason for call: Pt wants to know if it would be okay for her to wear a bra tomorrow being that she is having a procedure done.     Please advise    Call back#: [phone]

## 2024-03-27 ENCOUNTER — HOSPITAL ENCOUNTER (OUTPATIENT)
Dept: GASTROENTEROLOGY | Facility: HOSPITAL | Age: 84
Setting detail: OUTPATIENT SURGERY
Discharge: HOME | End: 2024-03-27
Attending: ANESTHESIOLOGY | Admitting: ANESTHESIOLOGY
Payer: MEDICARE

## 2024-03-27 VITALS
HEART RATE: 83 BPM | RESPIRATION RATE: 20 BRPM | DIASTOLIC BLOOD PRESSURE: 80 MMHG | SYSTOLIC BLOOD PRESSURE: 165 MMHG | OXYGEN SATURATION: 99 % | WEIGHT: 189 LBS | HEIGHT: 64 IN | BODY MASS INDEX: 32.27 KG/M2 | TEMPERATURE: 97.5 F

## 2024-03-27 DIAGNOSIS — M47.816 LUMBAR SPONDYLOSIS: ICD-10-CM

## 2024-03-27 PROCEDURE — 64493 INJ PARAVERT F JNT L/S 1 LEV: CPT | Performed by: ANESTHESIOLOGY

## 2024-03-27 PROCEDURE — 64494 INJ PARAVERT F JNT L/S 2 LEV: CPT | Performed by: ANESTHESIOLOGY

## 2024-03-27 RX ORDER — METHYLPREDNISOLONE ACETATE 80 MG/ML
INJECTION, SUSPENSION INTRA-ARTICULAR; INTRALESIONAL; INTRAMUSCULAR; SOFT TISSUE AS NEEDED
Status: COMPLETED | OUTPATIENT
Start: 2024-03-27 | End: 2024-03-27

## 2024-03-27 RX ORDER — LIDOCAINE HYDROCHLORIDE 10 MG/ML
INJECTION, SOLUTION EPIDURAL; INFILTRATION; INTRACAUDAL; PERINEURAL AS NEEDED
Status: COMPLETED | OUTPATIENT
Start: 2024-03-27 | End: 2024-03-27

## 2024-03-27 RX ORDER — BUPIVACAINE HYDROCHLORIDE 2.5 MG/ML
INJECTION, SOLUTION EPIDURAL; INFILTRATION; INTRACAUDAL AS NEEDED
Status: COMPLETED | OUTPATIENT
Start: 2024-03-27 | End: 2024-03-27

## 2024-03-27 RX ADMIN — LIDOCAINE HYDROCHLORIDE 10 ML: 10 INJECTION, SOLUTION EPIDURAL; INFILTRATION; INTRACAUDAL; PERINEURAL at 10:25

## 2024-03-27 RX ADMIN — BUPIVACAINE HYDROCHLORIDE 5 ML: 2.5 INJECTION, SOLUTION EPIDURAL; INFILTRATION; INTRACAUDAL; PERINEURAL at 10:28

## 2024-03-27 RX ADMIN — METHYLPREDNISOLONE ACETATE 80 MG: 80 INJECTION, SUSPENSION INTRA-ARTICULAR; INTRALESIONAL; INTRAMUSCULAR; SOFT TISSUE at 10:28

## 2024-04-12 ENCOUNTER — TELEMEDICINE (OUTPATIENT)
Dept: PAIN MEDICINE | Facility: CLINIC | Age: 84
End: 2024-04-12
Payer: MEDICARE

## 2024-04-12 DIAGNOSIS — M47.816 LUMBAR SPONDYLOSIS: Primary | ICD-10-CM

## 2024-04-12 PROCEDURE — 99442 PR PHYS/QHP TELEPHONE EVALUATION 11-20 MIN: CPT | Performed by: ANESTHESIOLOGY

## 2024-04-12 NOTE — PROGRESS NOTES
VIRTUAL VISIT; PATIENT IDENTIFIED BY NAME AND DATE OF BIRTH; PHYSICAL EXAM NOT PERFORMED SECONDARY TO VIRTUAL VISIT; EXAM FINDINGS BELOW FROM PRIOR ENCOUNTER. A TOTAL OF 20 MINUTES VIA TELEPHONE WERE SPENT WITH PATIENT DISCUSSING INTERVENTIONS, CARE PLAN AND COORDINATION OF FUTURE CARE      Assessment  1. Lumbar spondylosis - Bilateral  -     lyrica 25 mg capsule; Take 1 capsule (100 mg total) by mouth 3 (three) times a day  -     bilateral L3, L4, L5 medial branch blocks #1    Greater than 90% relief of pain with improved ability to participate with IADLs after bilateral L3, L4, L5 medial branch blocks #1 for at least one week. Previously reported the following symptomatology:     Axial low back pain described primarily by arthritic features.  Aching, nagging, indolent, stabbing, throbbing features in axial low back without radicular components.  5/5 strength bilaterally, negative SLR.  Positive facet loading maneuvers in lumbar spine elicited pain, positive tenderness to palpation over lumbar paraspinal muscles.  Findings correlate with prominent lumbar facet arthropathy seen throughout axial low back on MRI without significant nerve root compression. Has completed formal PT.  Currently she is neurologically intact without gait instability, saddle anesthesia or bowel/bladder abnormality. Risks, benefits alternative to medial branch blocks and subsequent radiofrequency ablation of successful thoroughly discussed with patient.  Handouts provided questions answered to patient satisfaction.    The patient has been experiencing moderate to severe axial spine pain that is causing functional deficit.  The pain has been present for at least 3 months and is not improving with conservative care.  Currently the patient is not experiencing any radicular features nor neurogenic claudication.  Non-facet pathology has been ruled out on clinical evaluation.    Plan  -bilateral L3, L4, L5 medial branch blocks #2; f/u 2 weeks  post procedure (patient to call back to schedule)  -gabapentin transitioned to lyrica 25 mg t.i.d. for patient; (has since weaned) counseled regarding sedative effects of taking this medication and provided up titration calendar.  Counseled not to take medication while driving or operating heavy machinery/using stairs  -script provided for formal physical therapy for lumbar facet arthropathy; Physician directed home exercise plan as per [employer] demonstrated and handouts provided that patient plans to participate with for 1 hour, twice a week for the next 6 weeks.     There are risks associated with opioid medications, including dependence, addiction and tolerance. The patient understands and agrees to use these medications only as prescribed. Potential side effects of the medications include, but are not limited to, constipation, drowsiness, addiction, impaired judgment and risk of fatal overdose if not taken as prescribed. The patient was warned against driving while taking sedation medications or operating heavy machinery. The patient voiced understanding. Sharing medications is a felony. At this point in time, the patient is showing no signs of addiction, abuse, diversion or suicidal ideation.     [place] Prescription Drug Monitoring Program report was reviewed and was appropriate      Complete risks and benefits including bleeding, infection, tissue reaction, nerve injury and allergic reaction were discussed. The approach was demonstrated using models and literature was provided. Verbal and written consent was obtained.     My impressions and treatment recommendations were discussed in detail with the patient who verbalized understanding and had no further questions.  Discharge instructions were provided. I personally saw and examined the patient and I agree with the above discussed plan of care.    No orders of the defined types were placed in this encounter.      History of Present Illness    Greater than  90% relief of pain with improved ability to participate with IADLs after bilateral L3, L4, L5 medial branch blocks #1 for at least one week. Previously reported the following symptomatology:     Linda Goldstein is a 83 y.o. female with pmhx of XOL, depression, recent TIA presenting with chronic low back pain described primarily as arthritic in nature.  She describes 8/10 low back pain that is worse in the mornings and worse at the end of the day.  The pain is characterized by achy, nagging, indolent, crampy, stabbing pain in her axial low back.  The patient describes that the pain is worse with standing for long periods of time on hard surfaces as well as with walking.  The patient is a very active individual and feels as though this pain compromises his participation with independent activities of daily living. The pain can be debilitating at times and contribute to significant disability, compromising overall activity and independent activities of daily living.  She has completed physical therapy with limited relief of symptoms.  Medications the patient has tried in the past include nsaids, tylenol. She describes no radicular symptoms and has good strength.  She denies any weakness numbness or paresthesias. The patient denies any bowel or bladder dysfunction, saddle anesthesia or gait instability as well.      I have personally reviewed and/or updated the patient's past medical history, past surgical history, family history, social history, current medications, allergies, and vital signs today.     Review of Systems   Constitutional:  Positive for activity change.   HENT: Negative.     Eyes: Negative.    Respiratory: Negative.     Cardiovascular: Negative.    Gastrointestinal: Negative.    Endocrine: Negative.    Genitourinary: Negative.    Musculoskeletal:  Positive for arthralgias, back pain, gait problem and myalgias.   Skin: Negative.    Allergic/Immunologic: Negative.    Neurological:  Negative for weakness  and numbness.   Hematological: Negative.    Psychiatric/Behavioral: Negative.     All other systems reviewed and are negative.      Patient Active Problem List   Diagnosis    Acute metabolic encephalopathy    Chronic low back pain    Stage 3a chronic kidney disease (CKD) ([facility])    Transient alteration of awareness    Cerebrovascular accident (CVA) ([facility])    Lumbar spondylosis    Lumbar radiculopathy       Past Medical History:   Diagnosis Date    Anxiety     Arthritis     Depression     Microscopic hematuria     Renal insufficiency        Past Surgical History:   Procedure Laterality Date    ARTHROSCOPY KNEE      CHOLECYSTECTOMY      IR SPINE AND PAIN PROCEDURE  3/27/2024    MENISCECTOMY         History reviewed. No pertinent family history.    Social History     Occupational History    Not on file   Tobacco Use    Smoking status: Never    Smokeless tobacco: Never   Vaping Use    Vaping status: Never Used   Substance and Sexual Activity    Alcohol use: Never    Drug use: Never    Sexual activity: Not on file       Current Outpatient Medications on File Prior to Visit   Medication Sig    acetaminophen (TYLENOL) 650 mg CR tablet Take 650 mg by mouth every 8 (eight) hours as needed for mild pain    aspirin (ECOTRIN LOW STRENGTH) 81 mg EC tablet Take 1 tablet (81 mg total) by mouth daily Do not start before December 6, 2023.    atorvastatin (LIPITOR) 40 mg tablet Take 1 tablet (40 mg total) by mouth every evening    Cequa 0.09 % SOLN instill 1 drop into both eyes twice a day    NON FORMULARY Neuvesta    sertraline (Zoloft) 25 mg tablet Take 1 tablet (25 mg total) by mouth daily    vitamin B-12 (CYANOCOBALAMIN) 500 MCG TABS Take 1 tablet (500 mcg total) by mouth daily    pregabalin (LYRICA) 25 mg capsule Take 1 capsule (25 mg total) by mouth 3 (three) times a day (Patient not taking: Reported on 4/12/2024)     No current facility-administered medications on file prior to visit.       Allergies   Allergen Reactions     Levofloxacin Rash     Itching,nausea    Bupivacaine Other (See Comments)     Reaction with last injection    Shellfish Allergy - Food Allergy Swelling    Shellfish-Derived Products - Food Allergy Swelling    Moxifloxacin Hives, Rash and Swelling    Penicillins Rash     hives       Physical Exam    There were no vitals taken for this visit.    Constitutional: normal, well developed, well nourished, alert, in no distress and non-toxic and no overt pain behavior. and obese  Eyes: anicteric  HEENT: grossly intact  Neck: supple, symmetric, trachea midline and no masses   Pulmonary:even and unlabored  Cardiovascular:No edema or pitting edema present  Skin:Normal without rashes or lesions and well hydrated  Psychiatric:Mood and affect appropriate  Neurologic:Cranial Nerves II-XII grossly intact Sensation grossly intact; no clonus negative suarez's. Reflexes 2+ and brisk. SLR negative bilaterally.   Musculoskeletal:normal gait. 5/5 strength bilaterally with AROM in lower extremities. Normal heel toe and tip toe walking.Significant pain with lumbar facet loading bilaterally and with lateral spine rotation. ttp over lumbar paraspinal muscles. Negative dillan's test, negative gaenslen's negative SIJ loading bilaterally.    Imaging    Narrative & Impression   MRI LUMBAR SPINE WITHOUT CONTRAST     INDICATION: lumbar spine.   Presented with confusion and severe, acute on chronic back pain. Pain began when trying to get out of bed. No trauma. Negative CT.     COMPARISON: 12/2/2023 lumbar spine and abdominopelvic CT     TECHNIQUE: Multiplanar, multisequence imaging of the lumbar spine was performed.  INTRAVENOUS CONTRAST: Not administered     IMAGE QUALITY:  Diagnostic     FINDINGS:     Vertebral bodies are numbered such that the 1st conical shape vertebral segment is called S1.  Iliolumbar ligaments at L5. 5 nonrib-bearing vertebral bodies, better seen on the CT.        VERTEBRAL BODIES:  Alignment:  Similar dextroscoliosis, mild  retrolisthesis at L1-L2 and L2-L3 and degenerative grade 1 anterolisthesis at L4-L5 and L5-S1.     Preserved lordosis.     Vertebral body heights:  Mild, chronic L1 compression deformity with superior endplate Schmorl's node. No bone retropulsion.     No acute fracture.     Bone marrow:  Mild L1-L2 fibrovascular degenerative endplate change. Multilevel small endplate Schmorl's nodes and minimal fatty endplate change.     Benign T10 hemangioma. No suspicious marrow edema or mass.     SACRUM: Normal.     DISTAL CORD AND CONUS:  Conus and nerve roots are within normal limits. Conus terminates at T12-L1. Tiny sacral perineural root sleeve cyst, usually clinically insignificant.     PARASPINAL SOFT TISSUES:  Posterior paraspinal muscle fatty infiltration.     Similar small peripelvic and larger cortical renal cysts and diverticulosis.     LOWER THORACIC DISC SPACES: Small T12-L1 disc bulge. No stenosis.     LUMBAR DISC SPACES: Diffuse desiccation. Moderate L1-L2 and mild L2-L3 and L3-L4 loss of height. Scattered vacuum discs. Individual levels:     L1-L2: Disc osteophyte asymmetric to the right. Mild facet osteoarthritis. Minimal central canal and mild bilateral foraminal narrowing.     L2-L3: Small disc osteophyte and right foraminal disc protrusion. Mild facet arthropathy. Minimal central canal and mild bilateral foraminal narrowing.     L3-L4: Small disc bulge. Facet hypertrophy with mild ligamentum flavum infolding. Minimal central canal mild left and moderate right foraminal narrowing. Evidence of mild mass effect on the exiting right L3 nerve root.     L4-L5: Mild disc uncovering and bulge. Facet hypertrophy with mild ligamentum flavum infolding. Mild bilateral foraminal narrowing.     L5-S1: Disc uncovering and small bulge. Facet hypertrophy. Patent central canal and foramen.     IMPRESSION:     Multilevel degenerative disc disease and facet osteoarthritis. Single small right-sided L2-L3 disc herniation.  Predominantly mild stenoses. Details above.     At L3-L4, degenerative change results in moderate asymmetric right foraminal narrowing with evidence of right L3 nerve root impingement. Correlate clinically for L3 radiculopathy symptoms.     Scoliosis and multilevel degenerative spondylolisthesis.     Mild, chronic L1 compression fracture.
